# Patient Record
Sex: FEMALE | Race: WHITE | Employment: FULL TIME | URBAN - NONMETROPOLITAN AREA
[De-identification: names, ages, dates, MRNs, and addresses within clinical notes are randomized per-mention and may not be internally consistent; named-entity substitution may affect disease eponyms.]

---

## 2019-10-15 ENCOUNTER — OFFICE VISIT (OUTPATIENT)
Dept: FAMILY MEDICINE CLINIC | Age: 29
End: 2019-10-15
Payer: COMMERCIAL

## 2019-10-15 VITALS
WEIGHT: 158.4 LBS | BODY MASS INDEX: 28.07 KG/M2 | TEMPERATURE: 98.7 F | DIASTOLIC BLOOD PRESSURE: 74 MMHG | HEIGHT: 63 IN | SYSTOLIC BLOOD PRESSURE: 118 MMHG | HEART RATE: 78 BPM

## 2019-10-15 DIAGNOSIS — F41.9 ANXIETY: Primary | ICD-10-CM

## 2019-10-15 DIAGNOSIS — F32.A DEPRESSION, UNSPECIFIED DEPRESSION TYPE: ICD-10-CM

## 2019-10-15 PROCEDURE — 99213 OFFICE O/P EST LOW 20 MIN: CPT | Performed by: FAMILY MEDICINE

## 2019-10-15 RX ORDER — VILAZODONE HYDROCHLORIDE 40 MG/1
40 TABLET ORAL DAILY
Refills: 4 | COMMUNITY
Start: 2019-10-14 | End: 2019-10-15 | Stop reason: SDUPTHER

## 2019-10-15 RX ORDER — BUPROPION HYDROCHLORIDE 75 MG/1
75 TABLET ORAL 3 TIMES DAILY
Qty: 60 TABLET | Refills: 3 | Status: SHIPPED
Start: 2019-10-15 | End: 2020-10-06 | Stop reason: ALTCHOICE

## 2019-10-15 RX ORDER — VILAZODONE HYDROCHLORIDE 40 MG/1
40 TABLET ORAL DAILY
Qty: 30 TABLET | Refills: 5 | Status: SHIPPED
Start: 2019-10-15 | End: 2020-10-06 | Stop reason: ALTCHOICE

## 2019-10-15 ASSESSMENT — PATIENT HEALTH QUESTIONNAIRE - PHQ9
SUM OF ALL RESPONSES TO PHQ QUESTIONS 1-9: 2
2. FEELING DOWN, DEPRESSED OR HOPELESS: 1
1. LITTLE INTEREST OR PLEASURE IN DOING THINGS: 1
SUM OF ALL RESPONSES TO PHQ9 QUESTIONS 1 & 2: 2
SUM OF ALL RESPONSES TO PHQ QUESTIONS 1-9: 2

## 2019-10-15 ASSESSMENT — ENCOUNTER SYMPTOMS
PHOTOPHOBIA: 0
ABDOMINAL PAIN: 0
COUGH: 0
BACK PAIN: 0
CONSTIPATION: 0
SORE THROAT: 0
BLOOD IN STOOL: 0
SHORTNESS OF BREATH: 0
VOMITING: 0
DIARRHEA: 0
NAUSEA: 0

## 2020-06-16 ENCOUNTER — OFFICE VISIT (OUTPATIENT)
Dept: FAMILY MEDICINE CLINIC | Age: 30
End: 2020-06-16
Payer: COMMERCIAL

## 2020-06-16 VITALS
HEART RATE: 87 BPM | WEIGHT: 162 LBS | DIASTOLIC BLOOD PRESSURE: 80 MMHG | OXYGEN SATURATION: 97 % | TEMPERATURE: 99.1 F | SYSTOLIC BLOOD PRESSURE: 124 MMHG | RESPIRATION RATE: 16 BRPM | BODY MASS INDEX: 28.7 KG/M2

## 2020-06-16 PROCEDURE — 99212 OFFICE O/P EST SF 10 MIN: CPT | Performed by: FAMILY MEDICINE

## 2020-10-06 ENCOUNTER — HOSPITAL ENCOUNTER (OUTPATIENT)
Dept: CT IMAGING | Age: 30
Discharge: HOME | End: 2020-10-08
Payer: COMMERCIAL

## 2020-10-06 ENCOUNTER — OFFICE VISIT (OUTPATIENT)
Dept: FAMILY MEDICINE CLINIC | Age: 30
End: 2020-10-06
Payer: COMMERCIAL

## 2020-10-06 ENCOUNTER — TELEPHONE (OUTPATIENT)
Dept: FAMILY MEDICINE CLINIC | Age: 30
End: 2020-10-06

## 2020-10-06 VITALS
OXYGEN SATURATION: 99 % | TEMPERATURE: 98 F | HEART RATE: 104 BPM | WEIGHT: 159 LBS | HEIGHT: 63 IN | DIASTOLIC BLOOD PRESSURE: 66 MMHG | SYSTOLIC BLOOD PRESSURE: 118 MMHG | BODY MASS INDEX: 28.17 KG/M2

## 2020-10-06 PROCEDURE — 70450 CT HEAD/BRAIN W/O DYE: CPT

## 2020-10-06 PROCEDURE — 70486 CT MAXILLOFACIAL W/O DYE: CPT

## 2020-10-06 PROCEDURE — 99213 OFFICE O/P EST LOW 20 MIN: CPT | Performed by: FAMILY MEDICINE

## 2020-10-06 RX ORDER — ONDANSETRON 4 MG/1
4 TABLET, FILM COATED ORAL DAILY PRN
Qty: 30 TABLET | Refills: 0 | Status: SHIPPED
Start: 2020-10-06 | End: 2021-01-21

## 2020-10-06 ASSESSMENT — PATIENT HEALTH QUESTIONNAIRE - PHQ9
SUM OF ALL RESPONSES TO PHQ9 QUESTIONS 1 & 2: 0
SUM OF ALL RESPONSES TO PHQ QUESTIONS 1-9: 0
SUM OF ALL RESPONSES TO PHQ QUESTIONS 1-9: 0
1. LITTLE INTEREST OR PLEASURE IN DOING THINGS: 0
2. FEELING DOWN, DEPRESSED OR HOPELESS: 0

## 2020-10-06 ASSESSMENT — ENCOUNTER SYMPTOMS
DIARRHEA: 0
PHOTOPHOBIA: 1
NAUSEA: 0
BLOOD IN STOOL: 0
SHORTNESS OF BREATH: 0
CONSTIPATION: 0
COUGH: 0
BACK PAIN: 0
VOMITING: 0
SORE THROAT: 0
ABDOMINAL PAIN: 0

## 2020-10-06 NOTE — PATIENT INSTRUCTIONS
Patient Education        Concussion: Care Instructions  Your Care Instructions     A concussion is a kind of injury to the brain. It happens when the head receives a hard blow. The impact can jar or shake the brain against the skull. This interrupts the brain's normal activities. Although you may have cuts or bruises on your head or face, you may have no other visible signs of a brain injury. In most cases, damage to the brain from a concussion can't be seen in tests such as a CT or MRI scan. For a few weeks, you may have low energy, dizziness, trouble sleeping, a headache, ringing in your ears, or nausea. You may also feel anxious, grumpy, or depressed. You may have problems with memory and concentration. These symptoms are common after a concussion. They should slowly improve over time. Sometimes this takes weeks or even months. Someone who lives with you should know how to care for you. Please share this and all information with a caregiver who will be available to help if needed. Follow-up care is a key part of your treatment and safety. Be sure to make and go to all appointments, and call your doctor if you are having problems. It's also a good idea to know your test results and keep a list of the medicines you take. How can you care for yourself at home? Pain control  · Put ice or a cold pack on the part of your head that hurts for 10 to 20 minutes at a time. Put a thin cloth between the ice and your skin. · Be safe with medicines. Read and follow all instructions on the label. ? If the doctor gave you a prescription medicine for pain, take it as prescribed. ? If you are not taking a prescription pain medicine, ask your doctor if you can take an over-the-counter medicine. Recovery  · Follow your doctor's instructions. He or she will tell you if you need someone to watch you closely for the next 24 hours or longer. · Rest is the best way to recover from a concussion.  You need to rest your body and your brain:  ? Get plenty of sleep at night. And take rest breaks during the day. ? Avoid activities that take a lot of physical or mental work. This includes housework, exercise, schoolwork, video games, text messaging, and using the computer. ? You may need to change your school or work schedule while you recover. ? Return to your normal activities slowly. Do not try to do too much at once. · Do not drink alcohol or use illegal drugs. Alcohol and illegal drugs can slow your recovery. And they can increase your risk of a second brain injury. · Avoid activities that could lead to another concussion. Follow your doctor's instructions for a gradual return to activity and sports. · Ask your doctor when it's okay for you to drive a car, ride a bike, or operate machinery. How should you return to activity? Your return to activity can begin after 1 to 2 days of physical and mental rest. After resting, you can gradually increase your activity as long as it does not cause new symptoms or worsen your symptoms. Doctors and concussion specialists suggest steps to follow for returning to sports after a concussion. Use these steps as a guide. You should slowly progress through the following levels of activity:  1. Limited activity. You can take part in daily activities as long as the activity doesn't increase your symptoms or cause new symptoms. 2. Light aerobic activity. This can include walking, swimming, or other exercise at less than 70% of maximum heart rate. No resistance training is included in this step. 3. Sport-specific exercise. This includes running drills or skating drills (depending on the sport), but no head impact. 4. Noncontact training drills. This includes more complex training drills such as passing. The athlete may also begin light resistance training. 5. Full-contact practice. The athlete can participate in normal training. 6. Return to normal game play.  This is the final step and allows the the head or body, a fall, or another injury that jars or shakes the brain. It will take time for you to get better. You may worry about how you are feeling. This is normal. TBIs often have long-term effects. These include:  · Not thinking clearly, or having trouble remembering new information. · Having headaches, vision problems, or dizziness. · Feeling sad or nervous. · Getting angry easily. · Sleeping more or less than usual.  No one will be able to tell you for sure how long the symptoms will last. But there are things you can do to help yourself get better. You may need another person to watch you closely to make sure that your symptoms aren't getting worse. Follow your doctor's instructions about how long you need someone to stay with you. Follow-up care is a key part of your treatment and safety. Be sure to make and go to all appointments, and call your doctor if you are having problems. It's also a good idea to know your test results and keep a list of the medicines you take. How can you care for yourself at home? What you and your doctors can do  Different types of therapy and support may used to help you recover from a TBI. Follow the plan your doctor suggests. This may include:  · Physical and occupational therapy. These help you return to daily activities and live as independently as possible. · Speech and language therapy. You may need help understanding and producing language. Speech and language therapists also help you organize daily tasks and develop problem-solving methods. · Counseling. This can help you understand your thoughts and learn ways to cope with your feelings. Counseling can help you feel more in control. It can help get you back to your life's activities. · Social support and support groups. It's important that you get the chance to talk with people who are going through the same things you are.  Your family or friends may be able to help you get treatment and deal with your symptoms. · Medicines. These may help relieve symptoms like sleep problems, chronic pain, and headaches. Medicine can also help if you have anxiety, depression, or memory problems. Talk with your doctor about what medicines might be best for you. Also ask which medicines you should not take. What you can do  Here are some ways you can help yourself:  · Get plenty of sleep, and take it easy during the day. Rest is the best way to recover. · Don't drink alcohol or use illegal drugs. · Don't drive a car, ride a bike, or operate machinery until your doctor says it's okay. · Avoid activities that are physically or mentally demanding. These include housework, exercise, schoolwork, video games, text messaging, or using the computer. You may need to change your school or work schedule for a while. · If you feel grumpy or irritable, get away from whatever is bothering you. When should you call for help? Watch closely for changes in your health, and be sure to contact your doctor if:  · Your symptoms get worse. These include headaches, trouble concentrating, or changes in your mood. · You have been feeling sad, depressed, or hopeless, or have lost interest in things you usually enjoy. · You do not get better as expected. Where can you learn more? Go to https://[employer].Buyt.In. org and sign in to your [employer] account. Enter P943 in the [facility] box to learn more about \"Traumatic Brain Injury, Long-Term Healing: Care Instructions. \"     If you do not have an account, please click on the \"Sign Up Now\" link. Current as of: November 20, 2019               Content Version: 12.5  © 1068-4301 Healthwise, Incorporated. Care instructions adapted under license by [facility] ([facility]). If you have questions about a medical condition or this instruction, always ask your healthcare professional. [address] any warranty or liability for your use of this information.

## 2020-10-06 NOTE — TELEPHONE ENCOUNTER
Isi calling to ask if you have restrictions for her at this time. Should she be driving, going to work and to school?

## 2020-10-06 NOTE — TELEPHONE ENCOUNTER
At this time the restrictions are going to be based on her symptoms. Current evidence shows that there is no benefit for cognitive rest.  However if there is a significant increase in symptoms with other work or school activity these activities should be adjusted. The same was driving if she is having issues with vision, dizziness, or headache which should limit or refrain from driving. We can put a note in to work or school for whatever she would like. As far as references the [facility] and [facility] has an extensive list of treatments and information about postconcussive symptoms. From my and if she is not any better significantly in the next week we will need to see her for possible neurology referral.  Please let me know what she would like to do.

## 2020-10-06 NOTE — PROGRESS NOTES
quitting: 10.4    Smokeless tobacco: Never Used   Substance Use Topics    Alcohol use: No        Vitals:    10/06/20 0923   BP: 118/66   Pulse: 104   Temp: 98 °F (36.7 °C)   TempSrc: Temporal   SpO2: 99%   Weight: 159 lb (72.1 kg)   Height: 5' 3\" (1.6 m)     Estimated body mass index is 28.17 kg/m² as calculated from the following:    Height as of this encounter: 5' 3\" (1.6 m). Weight as of this encounter: 159 lb (72.1 kg). Physical Exam  Vitals signs reviewed. HENT:      Head: Normocephalic and atraumatic. Nose: Signs of injury and nasal tenderness present. Eyes:      General: Lids are normal. No scleral icterus. Extraocular Movements:      Right eye: Nystagmus present. Left eye: Nystagmus present. Conjunctiva/sclera: Conjunctivae normal.      Pupils: Pupils are equal, round, and reactive to light. Neck:      Musculoskeletal: Neck supple. Thyroid: No thyromegaly. Cardiovascular:      Rate and Rhythm: Normal rate and regular rhythm. Heart sounds: Normal heart sounds. No murmur. Pulmonary:      Effort: Pulmonary effort is normal.      Breath sounds: Normal breath sounds. No rales. Abdominal:      General: Bowel sounds are normal. There is no distension. Palpations: Abdomen is soft. Tenderness: There is no abdominal tenderness. Musculoskeletal: Normal range of motion. Lymphadenopathy:      Cervical: No cervical adenopathy. Skin:     General: Skin is warm and dry. Findings: No erythema or rash. Neurological:      Mental Status: She is alert and oriented to person, place, and time. Cranial Nerves: No cranial nerve deficit. Sensory: Sensory deficit present. Comments: Horizontal nystagmus bilaterally as noted previous. Symptoms elicited with horizontal and vertical rapid eye movement testing. Negative pronator drift. Strength is intact to bilateral upper and lower extremities. Sensation deficits to left upper and lower extremity. Positive Romberg. Psychiatric:         Judgment: Judgment normal.         Assessment/Plan:   Diagnosis Orders   1. Traumatic brain injury, without loss of consciousness, initial encounter ([facility] [address]  CT HEAD WO CONTRAST    CT FACIAL BONES WO CONTRAST    ondansetron (ZOFRAN) 4 MG tablet   2. Concussion without loss of consciousness, initial encounter  CT HEAD WO CONTRAST    CT FACIAL BONES WO CONTRAST    ondansetron (ZOFRAN) 4 MG tablet   3. Closed fracture of nasal bone, initial encounter  CT HEAD WO CONTRAST    CT FACIAL BONES WO CONTRAST    ondansetron (ZOFRAN) 4 MG tablet       At this time we will order a stat CT of the head to rule out any intracranial issues. Treat symptomatically if patient cannot postconcussive syndrome. Exercise discussed with patient. Pain is. She is to go directly to the emergency department. Patient voiced understanding. Junaid May D.O.   12:22 PM  10/6/2020       This document may have been prepared at least partially through the use of voice recognition software. Although effort is taken to assure the accuracy of this document, it is possible that grammatical, syntax,  or spelling errors may occur.

## 2021-01-21 ENCOUNTER — OFFICE VISIT (OUTPATIENT)
Dept: PRIMARY CARE CLINIC | Age: 31
End: 2021-01-21
Payer: COMMERCIAL

## 2021-01-21 VITALS
RESPIRATION RATE: 16 BRPM | TEMPERATURE: 97.3 F | WEIGHT: 164 LBS | HEART RATE: 101 BPM | SYSTOLIC BLOOD PRESSURE: 132 MMHG | DIASTOLIC BLOOD PRESSURE: 72 MMHG | BODY MASS INDEX: 29.06 KG/M2 | OXYGEN SATURATION: 97 % | HEIGHT: 63 IN

## 2021-01-21 DIAGNOSIS — R10.9 FLANK PAIN: ICD-10-CM

## 2021-01-21 DIAGNOSIS — R10.9 FLANK PAIN: Primary | ICD-10-CM

## 2021-01-21 PROBLEM — M54.9 RIGHT-SIDED BACK PAIN: Status: ACTIVE | Noted: 2021-01-18

## 2021-01-21 LAB
BILIRUBIN, POC: NORMAL
BLOOD URINE, POC: NORMAL
CLARITY, POC: CLEAR
COLOR, POC: YELLOW
GLUCOSE URINE, POC: NORMAL
KETONES, POC: NORMAL
LEUKOCYTE EST, POC: NORMAL
NITRITE, POC: NORMAL
PH, POC: 6.5
PROTEIN, POC: NORMAL
SPECIFIC GRAVITY, POC: >=1.03
UROBILINOGEN, POC: 0.2

## 2021-01-21 PROCEDURE — 81002 URINALYSIS NONAUTO W/O SCOPE: CPT | Performed by: FAMILY MEDICINE

## 2021-01-21 PROCEDURE — 99213 OFFICE O/P EST LOW 20 MIN: CPT | Performed by: FAMILY MEDICINE

## 2021-01-21 ASSESSMENT — PATIENT HEALTH QUESTIONNAIRE - PHQ9
SUM OF ALL RESPONSES TO PHQ QUESTIONS 1-9: 0
SUM OF ALL RESPONSES TO PHQ9 QUESTIONS 1 & 2: 0
SUM OF ALL RESPONSES TO PHQ QUESTIONS 1-9: 0
1. LITTLE INTEREST OR PLEASURE IN DOING THINGS: 0

## 2021-01-21 ASSESSMENT — ENCOUNTER SYMPTOMS
GASTROINTESTINAL NEGATIVE: 1
BLOOD IN STOOL: 0
COUGH: 0
BACK PAIN: 1
NAUSEA: 0
DIARRHEA: 0
SHORTNESS OF BREATH: 0
VOMITING: 0
SORE THROAT: 0
RESPIRATORY NEGATIVE: 1
ABDOMINAL PAIN: 0
CONSTIPATION: 0
PHOTOPHOBIA: 0

## 2021-01-21 NOTE — PROGRESS NOTES
2021     Isi Wilder (:  1990) is a 31 y.o. female, here for evaluation of the following medical concerns:    HPI  Patient presents today for evaluation of flank pain. Patient states that the symptoms have been present for the last several days. Patient states that she is mildly concerned when the pain starts in the right flank/back region and radiates to the anterior chest.  Patient denies any dyspnea, nausea, vomiting, constipation, or diarrhea. Denies any excessive fatigue or myalgia. Denies any loss of taste or smell. Denies any known sick contact. Patient currently has IUD and has not had a menstrual period in quite some time. Denies any trauma or injury to the affected region. Denies any overuse or heavy lifting prior to symptoms beginning. Symptoms do not radiate down the legs. Patient states that there is nothing that makes the pain better or worse. Is not associated with eating. Review of Systems   Constitutional: Negative. Negative for chills and fever. HENT: Negative. Negative for congestion, hearing loss, nosebleeds and sore throat. Eyes: Negative for photophobia. Respiratory: Negative. Negative for cough and shortness of breath. Cardiovascular: Negative. Negative for chest pain, palpitations and leg swelling. Gastrointestinal: Negative. Negative for abdominal pain, blood in stool, constipation, diarrhea, nausea and vomiting. Endocrine: Negative for polydipsia. Genitourinary: Positive for flank pain. Negative for dysuria, frequency, hematuria and urgency. Musculoskeletal: Positive for back pain. Negative for arthralgias, gait problem, joint swelling and myalgias. Skin: Negative. Neurological: Negative for dizziness, tremors, weakness and headaches. Hematological: Does not bruise/bleed easily. Psychiatric/Behavioral: Negative for hallucinations and suicidal ideas. All other systems reviewed and are negative.       Prior to Visit Medications    Not on File        Social History     Tobacco Use    Smoking status: Former Smoker     Quit date: 4/14/2010     Years since quitting: 10.7    Smokeless tobacco: Never Used   Substance Use Topics    Alcohol use: No        Vitals:    01/21/21 1308   BP: 132/72   Pulse: 101   Resp: 16   Temp: 97.3 °F (36.3 °C)   SpO2: 97%   Weight: 164 lb (74.4 kg)   Height: 5' 3\" (1.6 m)     Estimated body mass index is 29.05 kg/m² as calculated from the following:    Height as of this encounter: 5' 3\" (1.6 m). Weight as of this encounter: 164 lb (74.4 kg). Physical Exam  Vitals signs reviewed. HENT:      Head: Normocephalic and atraumatic. Eyes:      General: No scleral icterus. Conjunctiva/sclera: Conjunctivae normal.      Pupils: Pupils are equal, round, and reactive to light. Neck:      Musculoskeletal: Neck supple. Thyroid: No thyromegaly. Cardiovascular:      Rate and Rhythm: Normal rate and regular rhythm. Heart sounds: Normal heart sounds. No murmur. Pulmonary:      Effort: Pulmonary effort is normal.      Breath sounds: Normal breath sounds. No rales. Abdominal:      General: Abdomen is flat. Bowel sounds are decreased. There is no distension. Palpations: Abdomen is soft. Tenderness: There is no abdominal tenderness. There is right CVA tenderness. There is no guarding or rebound. Negative signs include Chauhan's sign, Rovsing's sign, McBurney's sign and obturator sign. Musculoskeletal:         General: Tenderness present. Lumbar back: She exhibits tenderness, pain and spasm. Lymphadenopathy:      Cervical: No cervical adenopathy. Skin:     General: Skin is warm and dry. Findings: No erythema or rash. Neurological:      Mental Status: She is alert and oriented to person, place, and time. Cranial Nerves: No cranial nerve deficit. Psychiatric:         Judgment: Judgment normal.         Assessment/Plan:   Diagnosis Orders   1.  Flank pain  POCT Urinalysis no Micro    Culture, Urine    XR ACUTE ABD SERIES CHEST 1 VW     At this time patient advised to treat symptomatically. Radiologist saw no acute issue at this time. Patient given concerns regarding possible radiolucent kidney stone versus small kidney stone causing some of her issues. No signs of hydronephrosis or cholecystitis seen on x-ray. Red flags discussed with patient. Any of these occur she is to go directly to the emergency department. Patient will increase fluid intake and use ibuprofen as needed 3 times daily. Will call Monday if no improvement in symptoms or sooner if symptoms worsen. Sonu Larson D.O.   2:40 PM  1/21/2021       This document may have been prepared at least partially through the use of voice recognition software. Although effort is taken to assure the accuracy of this document, it is possible that grammatical, syntax,  or spelling errors may occur.

## 2021-01-22 ENCOUNTER — PATIENT MESSAGE (OUTPATIENT)
Dept: PRIMARY CARE CLINIC | Age: 31
End: 2021-01-22

## 2021-01-22 DIAGNOSIS — R10.9 FLANK PAIN: Primary | ICD-10-CM

## 2021-01-22 RX ORDER — TIZANIDINE 4 MG/1
2-4 TABLET ORAL EVERY 8 HOURS PRN
Qty: 90 TABLET | Refills: 3 | Status: SHIPPED
Start: 2021-01-22 | End: 2021-05-12

## 2021-01-22 NOTE — TELEPHONE ENCOUNTER
From: Manju Ibarra  To: Mary Burr DO  Sent: 1/22/2021 11:23 AM EST  Subject: Prescription Question    Hello,  My pain has worsened today. Can that muscle relaxer prescription be called in? After consideration , I figured it is an opportunity to see if it helps or not and help narrow the cause of my pain.    [facility] in [place], [place]    Thank you  Melvina Robin

## 2021-01-23 LAB — URINE CULTURE, ROUTINE: NORMAL

## 2021-05-12 ENCOUNTER — HOSPITAL ENCOUNTER (EMERGENCY)
Age: 31
Discharge: HOME | End: 2021-05-12
Attending: EMERGENCY MEDICINE
Payer: COMMERCIAL

## 2021-05-12 ENCOUNTER — TELEPHONE (OUTPATIENT)
Dept: FAMILY MEDICINE CLINIC | Age: 31
End: 2021-05-12

## 2021-05-12 VITALS
TEMPERATURE: 98.2 F | BODY MASS INDEX: 27.31 KG/M2 | SYSTOLIC BLOOD PRESSURE: 113 MMHG | WEIGHT: 160 LBS | RESPIRATION RATE: 20 BRPM | DIASTOLIC BLOOD PRESSURE: 79 MMHG | HEART RATE: 96 BPM | OXYGEN SATURATION: 96 % | HEIGHT: 64 IN

## 2021-05-12 DIAGNOSIS — M54.31 SCIATICA OF RIGHT SIDE: Primary | ICD-10-CM

## 2021-05-12 PROCEDURE — 96372 THER/PROPH/DIAG INJ SC/IM: CPT

## 2021-05-12 PROCEDURE — 99284 EMERGENCY DEPT VISIT MOD MDM: CPT

## 2021-05-12 PROCEDURE — 6360000002 HC RX W HCPCS: Performed by: EMERGENCY MEDICINE

## 2021-05-12 RX ORDER — CYCLOBENZAPRINE HCL 5 MG
5 TABLET ORAL 3 TIMES DAILY PRN
Qty: 9 TABLET | Refills: 0 | Status: SHIPPED | OUTPATIENT
Start: 2021-05-12 | End: 2021-05-15

## 2021-05-12 RX ORDER — OXYCODONE HYDROCHLORIDE AND ACETAMINOPHEN 5; 325 MG/1; MG/1
1 TABLET ORAL EVERY 6 HOURS PRN
Qty: 12 TABLET | Refills: 0 | Status: SHIPPED | OUTPATIENT
Start: 2021-05-12 | End: 2021-05-15

## 2021-05-12 RX ORDER — KETOROLAC TROMETHAMINE 30 MG/ML
30 INJECTION, SOLUTION INTRAMUSCULAR; INTRAVENOUS ONCE
Status: COMPLETED | OUTPATIENT
Start: 2021-05-12 | End: 2021-05-12

## 2021-05-12 RX ADMIN — KETOROLAC TROMETHAMINE 30 MG: 30 INJECTION, SOLUTION INTRAMUSCULAR; INTRAVENOUS at 05:35

## 2021-05-12 ASSESSMENT — PAIN SCALES - GENERAL
PAINLEVEL_OUTOF10: 10
PAINLEVEL_OUTOF10: 10

## 2021-05-12 ASSESSMENT — PAIN DESCRIPTION - ORIENTATION: ORIENTATION: RIGHT

## 2021-05-12 ASSESSMENT — PAIN DESCRIPTION - LOCATION: LOCATION: LEG

## 2021-05-12 ASSESSMENT — PAIN DESCRIPTION - DESCRIPTORS: DESCRIPTORS: STABBING;SHARP

## 2021-05-12 ASSESSMENT — PAIN DESCRIPTION - ONSET: ONSET: ON-GOING

## 2021-05-12 NOTE — ED PROVIDER NOTES
HPI:  5/12/21,   Time: 5:23 AM EDT       Stanislav Cruz is a 36 y.o. female presenting to the ED for right leg pain, beginning 1 day ago. The complaint has been persistent, moderate in severity, and worsened by changing position. Patient is a pleasant 26-year-old female who states she began having this pain yesterday that radiates from the right low back into the right hip and laterally down the right side of the thigh towards the knee. She has no focal hip or knee pain no pain to touch in his areas no recent fall or injury. She states does not recall lifting or straining anything but this feels similar to when she had sciatica during her pregnancy. She has not pregnant this time. Patient has no pain to the calf or swelling to the leg. She has no bruising or redness. No fevers or chills. No numbness or weakness to the leg but she states certain positions twisting and moving seem to elicit a sharp burning pain that goes into the right hip and down the right side of her lower lateral leg. No bowel or bladder incontinence no numbness or weakness. No urinary complaints. No fevers or chills no previous history of trauma or known disc disease. No IV drug use. No dysuria or urinary urgency or frequency. Patient is able to ambulate without difficulty. Review of Systems:   Pertinent positives and negatives are stated within HPI, all other systems reviewed and are negative.          --------------------------------------------- PAST HISTORY ---------------------------------------------  Past Medical History:  has a past medical history of Anemia and Depression. Past Surgical History:  has a past surgical history that includes Tonsillectomy. Social History:  reports that she quit smoking about 11 years ago. Her smoking use included cigarettes. She has never used smokeless tobacco. She reports current alcohol use. She reports that she does not use drugs.     Family History: family history is not on reviewed all laboratory and imaging results for this patient. Results are listed below. LABS:  No results found for this visit on 05/12/21. RADIOLOGY:  Interpreted by Radiologist.  No orders to display           ------------------------- NURSING NOTES AND VITALS REVIEWED ---------------------------   The nursing notes within the ED encounter and vital signs as below have been reviewed by myself. /79   Pulse 96   Temp 98.2 °F (36.8 °C) (Temporal)   Resp 20   Ht 5' 4\" (1.626 m)   Wt 160 lb (72.6 kg)   SpO2 96%   BMI 27.46 kg/m²   Oxygen Saturation Interpretation: Normal    The patients available past medical records and past encounters were reviewed. ------------------------------ ED COURSE/MEDICAL DECISION MAKING----------------------  Medications   ketorolac (TORADOL) injection 30 mg (30 mg Intramuscular Given 5/12/21 0535)         ED COURSE:       Medical Decision Making:    Patient is a pleasant 79-year-old female who reports right-sided leg pain with radicular symptoms into the right hip and down the right lateral thigh since yesterday. She states it feels felt similar to when she is had sciatica in the past.  She has no numbness or weakness no bowel bladder incontinence fever or chills. She is able to ambulate but states certain positions elicit the sharp burning pain down the leg. Differential diagnosis sciatica nerve pain radiculopathy musculoskeletal pain  Hip pain muscle strain    This patient has remained hemodynamically stable during their ED course. Patient was given a shot given a shot of IM Toradol here as she did drive here we are unable to give narcotics or muscle relaxers. She will be given a short course of Flexeril as well as Percocet. Anthony Caruso for the PDMP I see no red flags or short course of these medications she was advised to follow-up outpatient with her PCP and was given orthopedic spine Dr. Noa Pelayo to follow-up with as well.   Patient is comfortable the outpatient plan. Re-Evaluations:             Re-evaluation. Patients symptoms are improving. Patient had some improvement with Toradol but drove here so she is aware that she cannot receive any stronger pain medication or muscle relaxers prior to driving home. She is comfortable to plan for the prescriptions and she will fill them on her way home. Re-examination  5/12/21   5:23 AM EDT          Vital Signs:   Vitals:    05/12/21 0359 05/12/21 0537   BP: (!) 154/81 113/79   Pulse: 113 96   Resp: 20    Temp: 98.2 °F (36.8 °C)    TempSrc: Temporal    SpO2: 96%    Weight: 160 lb (72.6 kg)    Height: 5' 4\" (1.626 m)                  Counseling: The emergency provider has spoken with the patient and discussed todays results, in addition to providing specific details for the plan of care and counseling regarding the diagnosis and prognosis. Questions are answered at this time and they are agreeable with the plan.       --------------------------------- IMPRESSION AND DISPOSITION ---------------------------------    IMPRESSION  1. Sciatica of right side Stable       DISPOSITION  Disposition: Discharge to home  Patient condition is stable    NOTE: This report was transcribed using voice recognition software.  Every effort was made to ensure accuracy; however, inadvertent computerized transcription errors may be present        Partha Vera MD  05/12/21 2615

## 2021-05-12 NOTE — TELEPHONE ENCOUNTER
Patient was seen in [facility] on 5/11/2021. Patient stated that she was not sure if the medications that she was given were correct for her diagnosis. Patient stated that the note the doctor wrote was not why the patient was in the emergency room. Nurse spoke with Dr. Bettie Lee and he relayed to have patient take flexeril, however not the percocet.  Nurse made follow up appointment for tomorrow with Dr. Bettie Lee in [facility] for 9:00am, patient informed

## 2021-05-13 ENCOUNTER — OFFICE VISIT (OUTPATIENT)
Dept: PRIMARY CARE CLINIC | Age: 31
End: 2021-05-13
Payer: COMMERCIAL

## 2021-05-13 VITALS
HEART RATE: 84 BPM | OXYGEN SATURATION: 98 % | BODY MASS INDEX: 27.31 KG/M2 | RESPIRATION RATE: 16 BRPM | HEIGHT: 64 IN | TEMPERATURE: 97.6 F | WEIGHT: 160 LBS | DIASTOLIC BLOOD PRESSURE: 70 MMHG | SYSTOLIC BLOOD PRESSURE: 122 MMHG

## 2021-05-13 DIAGNOSIS — M25.551 RIGHT HIP PAIN: Primary | ICD-10-CM

## 2021-05-13 DIAGNOSIS — M54.41 ACUTE RIGHT-SIDED LOW BACK PAIN WITH RIGHT-SIDED SCIATICA: ICD-10-CM

## 2021-05-13 PROCEDURE — 99213 OFFICE O/P EST LOW 20 MIN: CPT | Performed by: FAMILY MEDICINE

## 2021-05-13 PROCEDURE — 96372 THER/PROPH/DIAG INJ SC/IM: CPT | Performed by: FAMILY MEDICINE

## 2021-05-13 RX ORDER — METHYLPREDNISOLONE ACETATE 80 MG/ML
80 INJECTION, SUSPENSION INTRA-ARTICULAR; INTRALESIONAL; INTRAMUSCULAR; SOFT TISSUE ONCE
Status: COMPLETED | OUTPATIENT
Start: 2021-05-13 | End: 2021-05-13

## 2021-05-13 RX ORDER — METHYLPREDNISOLONE 4 MG/1
TABLET ORAL
Qty: 1 KIT | Refills: 0 | Status: SHIPPED
Start: 2021-05-13 | End: 2021-08-30

## 2021-05-13 RX ORDER — TIZANIDINE 4 MG/1
2-4 TABLET ORAL EVERY 8 HOURS PRN
Qty: 90 TABLET | Refills: 3 | Status: SHIPPED
Start: 2021-05-13 | End: 2021-08-30

## 2021-05-13 RX ADMIN — METHYLPREDNISOLONE ACETATE 80 MG: 80 INJECTION, SUSPENSION INTRA-ARTICULAR; INTRALESIONAL; INTRAMUSCULAR; SOFT TISSUE at 10:01

## 2021-05-13 ASSESSMENT — ENCOUNTER SYMPTOMS
RESPIRATORY NEGATIVE: 1
GASTROINTESTINAL NEGATIVE: 1

## 2021-05-13 NOTE — PROGRESS NOTES
in her right hip/upper leg region was excruciating. She was able to put weight on the affected region but only in certain positions. She states that the pain was too much and she presented to the emergency department. She states that the emergency room physician only did a cursory exam and no imaging. She was prescribed pain medication and Flexeril and told to follow up with an orthopedic surgeon. She states that she did take the pain medication because of the unbearable pain to help her sleep. She also has taken the Flexeril which she states takes the edge off slightly. Patient denies any bowel or bladder incontinence. Patient denies any recent trauma. Patient has had issues with sciatica in the past.      Review of Systems   Constitutional: Negative. HENT: Negative. Respiratory: Negative. Cardiovascular: Negative. Gastrointestinal: Negative. Musculoskeletal: Positive for arthralgias, gait problem, joint swelling and myalgias. Neurological: Positive for weakness. All other systems reviewed and are negative. Current Outpatient Medications:     methylPREDNISolone (MEDROL DOSEPACK) 4 MG tablet, Take by mouth., Disp: 1 kit, Rfl: 0    Levonorgestrel (MIRENA, 52 MG, IU), by Intrauterine route, Disp: , Rfl:     oxyCODONE-acetaminophen (PERCOCET) 5-325 MG per tablet, Take 1 tablet by mouth every 6 hours as needed for Pain for up to 3 days. Intended supply: 3 days.  Take lowest dose possible to manage pain, Disp: 12 tablet, Rfl: 0    cyclobenzaprine (FLEXERIL) 5 MG tablet, Take 1 tablet by mouth 3 times daily as needed for Muscle spasms, Disp: 9 tablet, Rfl: 0   Patient Active Problem List   Diagnosis    Flank pain    Acute right-sided low back pain with right-sided sciatica    Right hip pain     Past Medical History:   Diagnosis Date    Anemia     Depression      Past Surgical History:   Procedure Laterality Date    TONSILLECTOMY       Social History     Socioeconomic History    Marital status:      Spouse name: Not on file    Number of children: Not on file    Years of education: Not on file    Highest education level: Not on file   Occupational History    Not on file   Social Needs    Financial resource strain: Not on file    Food insecurity     Worry: Not on file     Inability: Not on file    Transportation needs     Medical: Not on file     Non-medical: Not on file   Tobacco Use    Smoking status: Former Smoker     Types: Cigarettes     Quit date: 2010     Years since quittin.0    Smokeless tobacco: Never Used   Substance and Sexual Activity    Alcohol use: Yes     Comment: occasional wine    Drug use: No    Sexual activity: Yes     Partners: Male   Lifestyle    Physical activity     Days per week: Not on file     Minutes per session: Not on file    Stress: Not on file   Relationships    Social connections     Talks on phone: Not on file     Gets together: Not on file     Attends Zoroastrianism service: Not on file     Active member of club or organization: Not on file     Attends meetings of clubs or organizations: Not on file     Relationship status: Not on file    Intimate partner violence     Fear of current or ex partner: Not on file     Emotionally abused: Not on file     Physically abused: Not on file     Forced sexual activity: Not on file   Other Topics Concern    Not on file   Social History Narrative    Not on file     History reviewed. No pertinent family history. There are no preventive care reminders to display for this patient. There are no preventive care reminders to display for this patient. There are no preventive care reminders to display for this patient. There are no preventive care reminders to display for this patient.    Health Maintenance   Topic Date Due    Hepatitis C screen  Never done    Varicella vaccine (1 of 2 - 2-dose childhood series) Never done    COVID-19 Vaccine (1) Never done    HIV screen  Never done   [facility] Cervical cancer screen  Never done    Flu vaccine (Season Ended) 09/01/2021    DTaP/Tdap/Td vaccine (8 - Td) 05/29/2026    Hepatitis B vaccine  Completed    Hib vaccine  Completed    Hepatitis A vaccine  Aged Out    Meningococcal (ACWY) vaccine  Aged Out    Pneumococcal 0-64 years Vaccine  Aged Out      There are no preventive care reminders to display for this patient. There are no preventive care reminders to display for this patient. /70   Pulse 84   Temp 97.6 °F (36.4 °C)   Resp 16   Ht 5' 4\" (1.626 m)   Wt 160 lb (72.6 kg) Comment: 5/12/21  SpO2 98%   BMI 27.46 kg/m²       Physical Exam  Vitals signs reviewed. HENT:      Head: Normocephalic and atraumatic. Eyes:      General: No scleral icterus. Conjunctiva/sclera: Conjunctivae normal.      Pupils: Pupils are equal, round, and reactive to light. Neck:      Musculoskeletal: Neck supple. Thyroid: No thyromegaly. Cardiovascular:      Rate and Rhythm: Normal rate and regular rhythm. Heart sounds: Normal heart sounds. No murmur. Pulmonary:      Effort: Pulmonary effort is normal.      Breath sounds: Normal breath sounds. No rales. Abdominal:      General: Bowel sounds are normal. There is no distension. Palpations: Abdomen is soft. Tenderness: There is no abdominal tenderness. Musculoskeletal:        Legs:    Lymphadenopathy:      Cervical: No cervical adenopathy. Skin:     General: Skin is warm and dry. Findings: No erythema or rash. Neurological:      Mental Status: She is alert and oriented to person, place, and time. Cranial Nerves: No cranial nerve deficit. Psychiatric:         Judgment: Judgment normal.                 An electronic signature was used to authenticate this note.     --Damon Davis,

## 2021-08-26 ENCOUNTER — TELEPHONE (OUTPATIENT)
Dept: FAMILY MEDICINE CLINIC | Age: 31
End: 2021-08-26

## 2021-08-26 NOTE — TELEPHONE ENCOUNTER
Kenisha Horton would like to switch to dr Preet Kaba for new pcp if able? She prefers a female dr with more [place] availability.

## 2021-08-30 ENCOUNTER — OFFICE VISIT (OUTPATIENT)
Dept: FAMILY MEDICINE CLINIC | Age: 31
End: 2021-08-30
Payer: COMMERCIAL

## 2021-08-30 VITALS
WEIGHT: 166 LBS | OXYGEN SATURATION: 95 % | SYSTOLIC BLOOD PRESSURE: 112 MMHG | TEMPERATURE: 98.3 F | BODY MASS INDEX: 28.49 KG/M2 | DIASTOLIC BLOOD PRESSURE: 78 MMHG | HEART RATE: 85 BPM

## 2021-08-30 DIAGNOSIS — B96.89 ACUTE BACTERIAL SINUSITIS: Primary | ICD-10-CM

## 2021-08-30 DIAGNOSIS — Z20.822 EXPOSURE TO COVID-19 VIRUS: ICD-10-CM

## 2021-08-30 DIAGNOSIS — J01.90 ACUTE BACTERIAL SINUSITIS: Primary | ICD-10-CM

## 2021-08-30 PROCEDURE — 99213 OFFICE O/P EST LOW 20 MIN: CPT | Performed by: FAMILY MEDICINE

## 2021-08-30 PROCEDURE — 96372 THER/PROPH/DIAG INJ SC/IM: CPT | Performed by: FAMILY MEDICINE

## 2021-08-30 RX ORDER — METHYLPREDNISOLONE ACETATE 40 MG/ML
40 INJECTION, SUSPENSION INTRA-ARTICULAR; INTRALESIONAL; INTRAMUSCULAR; SOFT TISSUE ONCE
Status: COMPLETED | OUTPATIENT
Start: 2021-08-30 | End: 2021-08-30

## 2021-08-30 RX ORDER — GUAIFENESIN 600 MG/1
600 TABLET, EXTENDED RELEASE ORAL 2 TIMES DAILY
Qty: 30 TABLET | Refills: 0 | Status: SHIPPED | OUTPATIENT
Start: 2021-08-30 | End: 2021-09-14

## 2021-08-30 RX ORDER — AZITHROMYCIN 250 MG/1
250 TABLET, FILM COATED ORAL SEE ADMIN INSTRUCTIONS
Qty: 6 TABLET | Refills: 0 | Status: SHIPPED | OUTPATIENT
Start: 2021-08-30 | End: 2021-09-04

## 2021-08-30 RX ADMIN — METHYLPREDNISOLONE ACETATE 40 MG: 40 INJECTION, SUSPENSION INTRA-ARTICULAR; INTRALESIONAL; INTRAMUSCULAR; SOFT TISSUE at 11:50

## 2021-08-30 NOTE — LETTER
NOTIFICATION RETURN TO WORK / SCHOOL    8/30/2021    Ms. [facility]  [address]  [place] [phone]      To Whom It May Concern:    [facility] was tested for COVID-19 on 8/30, and the result is pending. She may return to work upon receiving negative test results or 10 days after onset of symptoms if test is positive. I recommend:return without restrictions    If there are questions or concerns, please have the patient contact our office.         Sincerely,      Bismark Weathers M.D.

## 2021-08-30 NOTE — PROGRESS NOTES
on days 2 - 5  -     guaiFENesin (MUCINEX) 600 MG extended release tablet; Take 1 tablet by mouth 2 times daily for 15 days    Exposure to COVID-19 virus  -     methylPREDNISolone acetate (DEPO-MEDROL) injection 40 mg  -     azithromycin (ZITHROMAX) 250 MG tablet; Take 1 tablet by mouth See Admin Instructions for 5 days 500mg on day 1 followed by 250mg on days 2 - 5  -     guaiFENesin (MUCINEX) 600 MG extended release tablet; Take 1 tablet by mouth 2 times daily for 15 days  -     COVID-19 Ambulatory; Future    Will test for Covid. Seems more likely just sinusitis      No follow-ups on file.     Electronically signed by Tariq Hopper MD on 8/30/21 at 11:08 AM EDT

## 2021-09-03 ENCOUNTER — OFFICE VISIT (OUTPATIENT)
Dept: FAMILY MEDICINE CLINIC | Age: 31
End: 2021-09-03
Payer: COMMERCIAL

## 2021-09-03 VITALS
BODY MASS INDEX: 27.83 KG/M2 | SYSTOLIC BLOOD PRESSURE: 110 MMHG | TEMPERATURE: 98.4 F | WEIGHT: 163 LBS | HEIGHT: 64 IN | DIASTOLIC BLOOD PRESSURE: 70 MMHG | HEART RATE: 84 BPM | OXYGEN SATURATION: 98 %

## 2021-09-03 DIAGNOSIS — R51.9 INTRACTABLE HEADACHE, UNSPECIFIED CHRONICITY PATTERN, UNSPECIFIED HEADACHE TYPE: Primary | ICD-10-CM

## 2021-09-03 PROCEDURE — 99213 OFFICE O/P EST LOW 20 MIN: CPT | Performed by: INTERNAL MEDICINE

## 2021-09-03 PROCEDURE — 96372 THER/PROPH/DIAG INJ SC/IM: CPT | Performed by: INTERNAL MEDICINE

## 2021-09-03 RX ORDER — KETOROLAC TROMETHAMINE 30 MG/ML
30 INJECTION, SOLUTION INTRAMUSCULAR; INTRAVENOUS ONCE
Status: COMPLETED | OUTPATIENT
Start: 2021-09-03 | End: 2021-09-03

## 2021-09-03 RX ORDER — PREDNISONE 10 MG/1
TABLET ORAL
Qty: 12 TABLET | Refills: 0 | Status: SHIPPED | OUTPATIENT
Start: 2021-09-03 | End: 2021-09-09

## 2021-09-03 RX ADMIN — KETOROLAC TROMETHAMINE 30 MG: 30 INJECTION, SOLUTION INTRAMUSCULAR; INTRAVENOUS at 09:28

## 2021-09-03 ASSESSMENT — ENCOUNTER SYMPTOMS
ABDOMINAL PAIN: 0
DIARRHEA: 0
VOMITING: 0
NAUSEA: 0
SHORTNESS OF BREATH: 0
COUGH: 0

## 2021-09-03 NOTE — PROGRESS NOTES
[address] [place]     9/3/21  Georgina Gray : 1990 Sex: female  Age: 36 y.o. Chief Complaint   Patient presents with    Headache       HPI    Patient presents today to express care complaining of intractable headache over the last week. States she was seen 4 days ago in [facility] with flulike symptoms. States she had a headache at that time fever nausea some sinus congestion and drainage. Was given a shot of methylprednisolone and a Z-Yonis. States all her symptoms have resolved now except a headache which is persistent. States she is really not a headachy type person. She was tested for Covid and negative. She is vaccinated also. Review of Systems   Constitutional: Negative for chills and fever. HENT: Negative. Respiratory: Negative for cough and shortness of breath. Cardiovascular: Negative for chest pain. Gastrointestinal: Negative for abdominal pain, diarrhea, nausea and vomiting. Genitourinary: Negative for dysuria and frequency. Musculoskeletal: Negative for myalgias. Neurological: Positive for dizziness and headaches. Negative for syncope and weakness. States she has had a couple short bouts of some mild dizziness. Does not describe vertigo. REST OF PERTINENT ROS GONE OVER AND WAS NEGATIVE. Current Outpatient Medications:     predniSONE (DELTASONE) 10 MG tablet, Take 3 tablets by mouth daily for 2 days, THEN 2 tablets daily for 2 days, THEN 1 tablet daily for 2 days. , Disp: 12 tablet, Rfl: 0    azithromycin (ZITHROMAX) 250 MG tablet, Take 1 tablet by mouth See Admin Instructions for 5 days 500mg on day 1 followed by 250mg on days 2 - 5, Disp: 6 tablet, Rfl: 0    guaiFENesin (MUCINEX) 600 MG extended release tablet, Take 1 tablet by mouth 2 times daily for 15 days, Disp: 30 tablet, Rfl: 0    Levonorgestrel (MIRENA, 52 MG, IU), by Intrauterine route, Disp: , Rfl:   Allergies   Allergen Reactions    Sulfa Antibiotics Past Medical History:   Diagnosis Date    Anemia     Depression      Past Surgical History:   Procedure Laterality Date    TONSILLECTOMY       No family history on file. Social History     Socioeconomic History    Marital status:      Spouse name: Not on file    Number of children: Not on file    Years of education: Not on file    Highest education level: Not on file   Occupational History    Not on file   Tobacco Use    Smoking status: Former Smoker     Types: Cigarettes     Quit date: 2010     Years since quittin.3    Smokeless tobacco: Never Used   Vaping Use    Vaping Use: Never used   Substance and Sexual Activity    Alcohol use: Yes     Comment: occasional wine    Drug use: No    Sexual activity: Yes     Partners: Male   Other Topics Concern    Not on file   Social History Narrative    Not on file     Social Determinants of Health     Financial Resource Strain:     Difficulty of Paying Living Expenses:    Food Insecurity:     Worried About Running Out of Food in the Last Year:     [address] in the Last Year:    Transportation Needs:     Lack of Transportation (Medical):  Lack of Transportation (Non-Medical):    Physical Activity:     Days of Exercise per Week:     Minutes of Exercise per Session:    Stress:     Feeling of Stress :    Social Connections:     Frequency of Communication with Friends and Family:     Frequency of Social Gatherings with Friends and Family:     Attends [employer] Services:     Active Member of Clubs or Organizations:     Attends Club or Organization Meetings:     Marital Status:    Intimate Partner Violence:     Fear of Current or Ex-Partner:     Emotionally Abused:     Physically Abused:     Sexually Abused:        Vitals:    21 0909   BP: 110/70   Pulse: 84   Temp: 98.4 °F (36.9 °C)   SpO2: 98%   Weight: 163 lb (73.9 kg)   Height: 5' 4\" (1.626 m)       Physical Exam  Vitals and nursing note reviewed. Constitutional:       General: She is not in acute distress. HENT:      Head: Normocephalic and atraumatic. Right Ear: Tympanic membrane, ear canal and external ear normal.      Left Ear: Tympanic membrane, ear canal and external ear normal.      Mouth/Throat:      Mouth: Mucous membranes are moist.      Pharynx: Oropharynx is clear. Eyes:      Extraocular Movements: Extraocular movements intact. Pupils: Pupils are equal, round, and reactive to light. Cardiovascular:      Rate and Rhythm: Normal rate and regular rhythm. Pulmonary:      Effort: Pulmonary effort is normal. No respiratory distress. Breath sounds: No wheezing, rhonchi or rales. Musculoskeletal:      Cervical back: Normal range of motion and neck supple. No tenderness. Skin:     General: Skin is warm and dry. Neurological:      General: No focal deficit present. Mental Status: She is alert and oriented to person, place, and time. Cranial Nerves: No cranial nerve deficit. Sensory: No sensory deficit. Motor: No weakness. Coordination: Coordination normal.      Gait: Gait normal.      Deep Tendon Reflexes: Reflexes normal.   Psychiatric:         Mood and Affect: Mood normal.         Behavior: Behavior normal.         Thought Content: Thought content normal.         Judgment: Judgment normal.                 Assessment and Plan:  [facility] was seen today for headache. Diagnoses and all orders for this visit:    Intractable headache, unspecified chronicity pattern, unspecified headache type    Other orders  -     predniSONE (DELTASONE) 10 MG tablet; Take 3 tablets by mouth daily for 2 days, THEN 2 tablets daily for 2 days, THEN 1 tablet daily for 2 days. -     ketorolac (TORADOL) injection 30 mg    Plan: Shot of Toradol. Tapering dose prednisone. Warned of potential side effects. To the emergency room over the weekend if any worsening. Notify us early next week if continuing symptoms.   Patient states that she is not pregnant. Told her if symptoms persist she will need scanned. No follow-ups on file. Seen By:  Betty Barger MD      *Document was created using voice recognition software. Note was reviewed however may contain grammatical errors.

## 2021-09-20 ENCOUNTER — OFFICE VISIT (OUTPATIENT)
Dept: FAMILY MEDICINE CLINIC | Age: 31
End: 2021-09-20
Payer: COMMERCIAL

## 2021-09-20 VITALS
BODY MASS INDEX: 27.14 KG/M2 | HEART RATE: 76 BPM | TEMPERATURE: 97.4 F | HEIGHT: 64 IN | OXYGEN SATURATION: 98 % | DIASTOLIC BLOOD PRESSURE: 68 MMHG | WEIGHT: 159 LBS | SYSTOLIC BLOOD PRESSURE: 106 MMHG

## 2021-09-20 DIAGNOSIS — M79.642 PAIN IN BOTH HANDS: ICD-10-CM

## 2021-09-20 DIAGNOSIS — R51.9 CHRONIC NONINTRACTABLE HEADACHE, UNSPECIFIED HEADACHE TYPE: ICD-10-CM

## 2021-09-20 DIAGNOSIS — F33.1 MODERATE EPISODE OF RECURRENT MAJOR DEPRESSIVE DISORDER (HCC): Primary | ICD-10-CM

## 2021-09-20 DIAGNOSIS — M79.641 PAIN IN BOTH HANDS: ICD-10-CM

## 2021-09-20 DIAGNOSIS — Z00.00 PREVENTATIVE HEALTH CARE: ICD-10-CM

## 2021-09-20 DIAGNOSIS — G89.29 CHRONIC NONINTRACTABLE HEADACHE, UNSPECIFIED HEADACHE TYPE: ICD-10-CM

## 2021-09-20 PROBLEM — M25.551 RIGHT HIP PAIN: Status: RESOLVED | Noted: 2021-05-13 | Resolved: 2021-09-20

## 2021-09-20 PROBLEM — M54.41 ACUTE RIGHT-SIDED LOW BACK PAIN WITH RIGHT-SIDED SCIATICA: Status: RESOLVED | Noted: 2021-01-18 | Resolved: 2021-09-20

## 2021-09-20 PROBLEM — R10.9 FLANK PAIN: Status: RESOLVED | Noted: 2021-01-18 | Resolved: 2021-09-20

## 2021-09-20 PROCEDURE — 99214 OFFICE O/P EST MOD 30 MIN: CPT | Performed by: FAMILY MEDICINE

## 2021-09-20 RX ORDER — BUPROPION HYDROCHLORIDE 150 MG/1
150 TABLET ORAL EVERY MORNING
Qty: 30 TABLET | Refills: 1 | Status: SHIPPED
Start: 2021-09-20 | End: 2021-11-29

## 2021-09-20 ASSESSMENT — ENCOUNTER SYMPTOMS
VOMITING: 0
NAUSEA: 0
SHORTNESS OF BREATH: 0
WHEEZING: 0

## 2021-09-20 NOTE — PROGRESS NOTES
[facility] presents to the office today for   Chief Complaint   Patient presents with   Ghazala Shanti Doctor     Previous PCP Dr. Jen Hoover    IUD in place  GYN Dr. 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 Respiratory: Negative for shortness of breath and wheezing. Cardiovascular: Negative for chest pain and palpitations. Gastrointestinal: Negative for nausea and vomiting. Genitourinary: Negative for dysuria, hematuria and urgency. Skin: Negative for rash. Neurological: Negative for dizziness and light-headedness. /68   Pulse 76   Temp 97.4 °F (36.3 °C) (Temporal)   Ht 5' 4\" (1.626 m)   Wt 159 lb (72.1 kg)   SpO2 98%   BMI 27.29 kg/m²   Physical Exam  Constitutional:       Appearance: Normal appearance. HENT:      Head: Normocephalic and atraumatic. Eyes:      Extraocular Movements: Extraocular movements intact. Conjunctiva/sclera: Conjunctivae normal.   Cardiovascular:      Rate and Rhythm: Normal rate. Heart sounds: Normal heart sounds. Pulmonary:      Effort: Pulmonary effort is normal.      Breath sounds: Normal breath sounds. Skin:     General: Skin is warm. Neurological:      Mental Status: She is alert and oriented to person, place, and time.    Psychiatric:         Mood and Affect: Mood normal.         Behavior: Behavior normal.            Current Outpatient Medications:     buPROPion (WELLBUTRIN XL) 150 MG extended release tablet, Take 1 tablet by mouth every morning, Disp: 30 tablet, Rfl: 1    Levonorgestrel (MIRENA, 52 MG, IU), by Intrauterine route, Disp: , Rfl:      Past Medical History:   Diagnosis Date    Anemia     Depression     Moderate episode of recurrent major depressive disorder ([facility] [address] 9/20/2021       Kenisha Horton was seen today for established new doctor. Diagnoses and all orders for this visit:    Moderate episode of recurrent major depressive disorder (HCC)  -     buPROPion (WELLBUTRIN XL) 150 MG extended release tablet; Take 1 tablet by mouth every morning    Chronic nonintractable headache, unspecified headache type    Pain in both hands  -     C-Reactive Protein; Future  -     Rheumatoid Factor; Future  -     Sedimentation Rate; Future    Preventative health care  -     CBC; Future  -     Comprehensive Metabolic Panel; Future  -     Lipid Panel; Future  -     TSH without Reflex;  Future       Start Wellbutrin  Labs today  Flu shot today  Recheck 6 weeks    Heriberto Puga MD

## 2021-09-28 ENCOUNTER — HOSPITAL ENCOUNTER (OUTPATIENT)
Dept: CT IMAGING | Age: 31
Discharge: HOME | End: 2021-09-30
Payer: COMMERCIAL

## 2021-09-28 ENCOUNTER — TELEPHONE (OUTPATIENT)
Dept: FAMILY MEDICINE CLINIC | Age: 31
End: 2021-09-28

## 2021-09-28 DIAGNOSIS — R51.9 ACUTE INTRACTABLE HEADACHE, UNSPECIFIED HEADACHE TYPE: ICD-10-CM

## 2021-09-28 PROCEDURE — 70450 CT HEAD/BRAIN W/O DYE: CPT

## 2021-09-29 NOTE — TELEPHONE ENCOUNTER
Called patient and notified her that the CT of her head was unremarkable. Advised patient to follow-up with Dr Cassandra Denise. Patient verbalized understanding and will follow-up.

## 2021-11-24 DIAGNOSIS — F33.1 MODERATE EPISODE OF RECURRENT MAJOR DEPRESSIVE DISORDER (HCC): ICD-10-CM

## 2021-11-29 RX ORDER — BUPROPION HYDROCHLORIDE 150 MG/1
150 TABLET ORAL EVERY MORNING
Qty: 30 TABLET | Refills: 5 | Status: SHIPPED | OUTPATIENT
Start: 2021-11-29

## 2021-11-29 NOTE — TELEPHONE ENCOUNTER
Last Appointment:  9/20/2021  Future Appointments   Date Time Provider Anthony Varela   12/9/2021 11:45 AM Shay Patino  [address]

## 2021-12-04 ENCOUNTER — OFFICE VISIT (OUTPATIENT)
Dept: FAMILY MEDICINE CLINIC | Age: 31
End: 2021-12-04
Payer: COMMERCIAL

## 2021-12-04 VITALS
HEART RATE: 102 BPM | WEIGHT: 166 LBS | DIASTOLIC BLOOD PRESSURE: 78 MMHG | BODY MASS INDEX: 28.34 KG/M2 | TEMPERATURE: 98.7 F | SYSTOLIC BLOOD PRESSURE: 120 MMHG | HEIGHT: 64 IN | OXYGEN SATURATION: 98 %

## 2021-12-04 DIAGNOSIS — J01.40 ACUTE NON-RECURRENT PANSINUSITIS: ICD-10-CM

## 2021-12-04 DIAGNOSIS — R05.9 COUGH: Primary | ICD-10-CM

## 2021-12-04 PROCEDURE — 99213 OFFICE O/P EST LOW 20 MIN: CPT | Performed by: FAMILY MEDICINE

## 2021-12-04 RX ORDER — FLUTICASONE PROPIONATE 50 MCG
1 SPRAY, SUSPENSION (ML) NASAL DAILY
COMMUNITY
End: 2021-12-09

## 2021-12-04 RX ORDER — PREDNISONE 10 MG/1
TABLET ORAL
Qty: 18 TABLET | Refills: 0 | Status: SHIPPED
Start: 2021-12-04 | End: 2021-12-09

## 2021-12-04 RX ORDER — AMOXICILLIN 500 MG/1
500 CAPSULE ORAL 3 TIMES DAILY
Qty: 30 CAPSULE | Refills: 0 | Status: SHIPPED | OUTPATIENT
Start: 2021-12-04 | End: 2021-12-14

## 2021-12-04 RX ORDER — CETIRIZINE HYDROCHLORIDE 10 MG/1
10 TABLET ORAL DAILY
COMMUNITY
End: 2021-12-09

## 2021-12-04 ASSESSMENT — ENCOUNTER SYMPTOMS
TROUBLE SWALLOWING: 0
BACK PAIN: 0
BLOOD IN STOOL: 0
CHEST TIGHTNESS: 0
SINUS PAIN: 0
SORE THROAT: 1
DIARRHEA: 0
SINUS PRESSURE: 1
EYE REDNESS: 0
EYE DISCHARGE: 0
EYE PAIN: 0
COUGH: 1
ABDOMINAL PAIN: 0
SHORTNESS OF BREATH: 0
NAUSEA: 0
VOMITING: 0
ALLERGIC/IMMUNOLOGIC NEGATIVE: 1
PHOTOPHOBIA: 0

## 2021-12-04 NOTE — PROGRESS NOTES
21  Esteban Copeland [facility] : 1990 Sex: female  Age: 36 y.o. Assessment and Plan:  Walter Velasquez was seen today for pharyngitis, drainage, other and nasal congestion. Diagnoses and all orders for this visit:    Cough  -     COVID-19 Ambulatory; Future    Acute non-recurrent pansinusitis  -     amoxicillin (AMOXIL) 500 MG capsule; Take 1 capsule by mouth 3 times daily for 10 days  -     predniSONE (DELTASONE) 10 MG tablet; Take 3 tablets by mouth daily for 3 days, THEN 2 tablets daily for 3 days, THEN 1 tablet daily for 3 days. Tylenol, fluids, rest, cool mist, call, recheck here or to ER immediately if condition worsens      Return if symptoms worsen or fail to improve. Chief Complaint   Patient presents with    Pharyngitis     x 1 week 21    Drainage     Taking flonase and Zyrtec     Other     Rapid home Covid test 21 was negative.  Nasal Congestion       Congestion, pressure, drainage, facial tenderness, mild headache, onset 3 days ago. She took a Covid test at home which was negative on Thursday. Her daughter just got over strep. Denies fever, chills, diaphoresis, nausea, vomiting, decreased oral intake. Denies other GI or  complaints. OTC treatments minimally effective. Has an IUD in place, not having regular periods      Review of Systems   Constitutional: Negative for appetite change, fatigue and unexpected weight change. HENT: Positive for congestion, sinus pressure and sore throat. Negative for ear pain, hearing loss, sinus pain and trouble swallowing. Eyes: Negative for photophobia, pain, discharge and redness. Respiratory: Positive for cough. Negative for chest tightness and shortness of breath. Cardiovascular: Negative for chest pain, palpitations and leg swelling. Gastrointestinal: Negative for abdominal pain, blood in stool, diarrhea, nausea and vomiting. Endocrine: Negative.     Genitourinary: Negative for dysuria, flank pain, frequency and hematuria. Musculoskeletal: Negative for arthralgias, back pain, joint swelling and myalgias. Skin: Negative. Allergic/Immunologic: Negative. Neurological: Negative for dizziness, seizures, syncope, weakness, light-headedness, numbness and headaches. Hematological: Negative for adenopathy. Does not bruise/bleed easily. Psychiatric/Behavioral: Negative. Current Outpatient Medications:     fluticasone (FLONASE) 50 MCG/ACT nasal spray, 1 spray by Each Nostril route daily, Disp: , Rfl:     cetirizine (ZYRTEC) 10 MG tablet, Take 10 mg by mouth daily, Disp: , Rfl:     amoxicillin (AMOXIL) 500 MG capsule, Take 1 capsule by mouth 3 times daily for 10 days, Disp: 30 capsule, Rfl: 0    predniSONE (DELTASONE) 10 MG tablet, Take 3 tablets by mouth daily for 3 days, THEN 2 tablets daily for 3 days, THEN 1 tablet daily for 3 days. , Disp: 18 tablet, Rfl: 0    buPROPion (WELLBUTRIN XL) 150 MG extended release tablet, take 1 tablet by mouth every morning, Disp: 30 tablet, Rfl: 5    Levonorgestrel (MIRENA, 52 MG, IU), by Intrauterine route, Disp: , Rfl:   Allergies   Allergen Reactions    Sulfa Antibiotics        Past Medical History:   Diagnosis Date    Anemia     Depression     [facility]     Past Surgical History:   Procedure Laterality Date    TONSILLECTOMY       No family history on file.   Social History     Socioeconomic History    Marital status:      Spouse name: Not on file    Number of children: Not on file    Years of education: Not on file    Highest education level: Not on file   Occupational History    Not on file   Tobacco Use    Smoking status: Former Smoker     Types: Cigarettes     Quit date: 2010     Years since quittin.6    Smokeless tobacco: Never Used   Vaping Use    Vaping Use: Never used   Substance and Sexual Activity    Alcohol use: Yes     Comment: occasional wine    Drug use: No    Sexual activity: Yes     Partners: Male   Other Topics Concern    Not on file   Social History Narrative    Not on file     Social Determinants of Health     Financial Resource Strain:     Difficulty of Paying Living Expenses: Not on file   Food Insecurity:     Worried About [address] in the Last Year: Not on file    [place] of Food in the Last Year: Not on file   Transportation Needs:     Lack of Transportation (Medical): Not on file    Lack of Transportation (Non-Medical): Not on file   Physical Activity:     Days of Exercise per Week: Not on file    Minutes of Exercise per Session: Not on file   Stress:     Feeling of Stress : Not on file   Social Connections:     Frequency of Communication with Friends and Family: Not on file    Frequency of Social Gatherings with Friends and Family: Not on file    Attends Sikhism Services: Not on file    Active Member of [address] [place] [id number] or Organizations: Not on file    Attends Club or Organization Meetings: Not on file    Marital Status: Not on file   Intimate Partner Violence:     Fear of Current or Ex-Partner: Not on file    Emotionally Abused: Not on file    Physically Abused: Not on file    Sexually Abused: Not on file   Housing Stability:     Unable to Pay for Housing in the Last Year: Not on file    Number of [place] in the Last Year: Not on file    Unstable Housing in the Last Year: Not on file       Vitals:    12/04/21 0806   BP: 120/78   Pulse: 102   Temp: 98.7 °F (37.1 °C)   SpO2: 98%   Weight: 166 lb (75.3 kg)   Height: 5' 4\" (1.626 m)       Physical Exam  Vitals and nursing note reviewed. Constitutional:       Appearance: She is well-developed. HENT:      Head: Atraumatic. Right Ear: External ear normal.      Left Ear: External ear normal.      Nose: Congestion present. Mouth/Throat:      Pharynx: Posterior oropharyngeal erythema present. No oropharyngeal exudate.    Eyes:      Conjunctiva/sclera: Conjunctivae normal. Pupils: Pupils are equal, round, and reactive to light. Neck:      Thyroid: No thyromegaly. Trachea: No tracheal deviation. Cardiovascular:      Rate and Rhythm: Normal rate and regular rhythm. Heart sounds: No murmur heard. No friction rub. No gallop. Pulmonary:      Effort: Pulmonary effort is normal. No respiratory distress. Breath sounds: Normal breath sounds. Abdominal:      General: Bowel sounds are normal.      Palpations: Abdomen is soft. Musculoskeletal:         General: No tenderness or deformity. Normal range of motion. Cervical back: Normal range of motion and neck supple. Lymphadenopathy:      Cervical: No cervical adenopathy. Skin:     General: Skin is warm and dry. Capillary Refill: Capillary refill takes less than 2 seconds. Findings: No rash. Neurological:      Mental Status: She is alert and oriented to person, place, and time. Sensory: No sensory deficit. Motor: No abnormal muscle tone.       Coordination: Coordination normal.      Deep Tendon Reflexes: Reflexes normal.             Seen By:  Araceli Weller,

## 2021-12-05 DIAGNOSIS — R05.9 COUGH: ICD-10-CM

## 2021-12-05 LAB — SOURCE: NORMAL

## 2021-12-06 ENCOUNTER — TELEPHONE (OUTPATIENT)
Dept: FAMILY MEDICINE CLINIC | Age: 31
End: 2021-12-06

## 2021-12-06 ENCOUNTER — OFFICE VISIT (OUTPATIENT)
Dept: FAMILY MEDICINE CLINIC | Age: 31
End: 2021-12-06
Payer: COMMERCIAL

## 2021-12-06 VITALS
BODY MASS INDEX: 28.49 KG/M2 | OXYGEN SATURATION: 98 % | WEIGHT: 166 LBS | TEMPERATURE: 97.5 F | DIASTOLIC BLOOD PRESSURE: 76 MMHG | HEART RATE: 95 BPM | SYSTOLIC BLOOD PRESSURE: 118 MMHG

## 2021-12-06 DIAGNOSIS — R06.02 SHORTNESS OF BREATH: Primary | ICD-10-CM

## 2021-12-06 DIAGNOSIS — R06.02 SHORTNESS OF BREATH: ICD-10-CM

## 2021-12-06 LAB
D DIMER: 279 NG/ML DDU
SARS-COV-2, PCR: NOT DETECTED

## 2021-12-06 PROCEDURE — 99213 OFFICE O/P EST LOW 20 MIN: CPT | Performed by: FAMILY MEDICINE

## 2021-12-06 NOTE — PROGRESS NOTES
[address] [facility] presents to the office today for   Chief Complaint   Patient presents with    Shortness of Breath     Shortness of breath  Dry cough due to feeling like she is running out of air  hoarseness and congestion improved  Home COVID neg  COVID PCR Negative  She admits could be anxiety      Review of Systems     /76   Pulse 95   Temp 97.5 °F (36.4 °C) (Temporal)   Wt 166 lb (75.3 kg)   SpO2 98%   BMI 28.49 kg/m²   Physical Exam  Constitutional:       Appearance: Normal appearance. HENT:      Head: Normocephalic and atraumatic. Eyes:      Extraocular Movements: Extraocular movements intact. Conjunctiva/sclera: Conjunctivae normal.   Cardiovascular:      Rate and Rhythm: Normal rate. Heart sounds: Normal heart sounds. Pulmonary:      Effort: Pulmonary effort is normal.      Breath sounds: Normal breath sounds. Skin:     General: Skin is warm. Neurological:      Mental Status: She is alert and oriented to person, place, and time. Psychiatric:         Mood and Affect: Mood normal.         Behavior: Behavior normal.            Current Outpatient Medications:     fluticasone (FLONASE) 50 MCG/ACT nasal spray, 1 spray by Each Nostril route daily, Disp: , Rfl:     cetirizine (ZYRTEC) 10 MG tablet, Take 10 mg by mouth daily, Disp: , Rfl:     amoxicillin (AMOXIL) 500 MG capsule, Take 1 capsule by mouth 3 times daily for 10 days, Disp: 30 capsule, Rfl: 0    predniSONE (DELTASONE) 10 MG tablet, Take 3 tablets by mouth daily for 3 days, THEN 2 tablets daily for 3 days, THEN 1 tablet daily for 3 days. , Disp: 18 tablet, Rfl: 0    buPROPion (WELLBUTRIN XL) 150 MG extended release tablet, take 1 tablet by mouth every morning, Disp: 30 tablet, Rfl: 5    Levonorgestrel (MIRENA, 52 MG, IU), by Intrauterine route, Disp: , Rfl:      Past Medical History:   Diagnosis Date    Anemia     Depression     Moderate episode of recurrent major depressive disorder ([facility] [address] 9/20/2021       [place] was seen today for shortness of breath. Diagnoses and all orders for this visit:    Shortness of breath  -     XR CHEST (2 VW); Future  -     D-DIMER, QUANTITATIVE;  Future       Check xray  D-dimer to rule out PE although low suspicion    Jamel Julian MD

## 2021-12-06 NOTE — TELEPHONE ENCOUNTER
reji calling for lab result and xray result. States you were ruling out a PE please advise  She would like a result before end of day.  Please call her or send a Brandcast[employer] message

## 2021-12-07 ENCOUNTER — PATIENT MESSAGE (OUTPATIENT)
Dept: FAMILY MEDICINE CLINIC | Age: 31
End: 2021-12-07

## 2021-12-07 NOTE — TELEPHONE ENCOUNTER
From: Claire Ross  Sent: 12/7/2021 8:33 AM EST  To: [facility] Lucy Merrill  Clinical Pool  Subject: COVID negative    Thank you, Dr Rudolph Olvera. I don't feel much different. I coughed again all night. If I sit and not talk - I do not cough. As soon as I try to talk and/or move around. . I have to cough and I feel winded. I am finding myself fighting to not cough, which could be causing me to feel extra winded sometimes. No one wants to cough in 2021. Could this be a side effect of the Prednisone? Honestly I am having a really have a hard time believing this is anxiety. .. I have lived with anxiety my entire life .

## 2021-12-09 ENCOUNTER — OFFICE VISIT (OUTPATIENT)
Dept: FAMILY MEDICINE CLINIC | Age: 31
End: 2021-12-09
Payer: COMMERCIAL

## 2021-12-09 VITALS
BODY MASS INDEX: 28.32 KG/M2 | HEART RATE: 90 BPM | SYSTOLIC BLOOD PRESSURE: 110 MMHG | WEIGHT: 165 LBS | TEMPERATURE: 97.2 F | OXYGEN SATURATION: 98 % | DIASTOLIC BLOOD PRESSURE: 72 MMHG

## 2021-12-09 DIAGNOSIS — R05.9 COUGH: ICD-10-CM

## 2021-12-09 DIAGNOSIS — F33.1 MODERATE EPISODE OF RECURRENT MAJOR DEPRESSIVE DISORDER (HCC): Primary | ICD-10-CM

## 2021-12-09 PROCEDURE — 99213 OFFICE O/P EST LOW 20 MIN: CPT | Performed by: FAMILY MEDICINE

## 2021-12-09 NOTE — PROGRESS NOTES
[address] [facility] presents to the office today for   Chief Complaint   Patient presents with    Depression     follow up     Cough  Feeling better off prednisone    Depression  Wellbutrin is helpful  No side effects  No SI    Review of Systems     /72   Pulse 90   Temp 97.2 °F (36.2 °C) (Temporal)   Wt 165 lb (74.8 kg)   SpO2 98%   BMI 28.32 kg/m²   Physical Exam  Constitutional:       Appearance: Normal appearance. HENT:      Head: Normocephalic and atraumatic. Eyes:      Extraocular Movements: Extraocular movements intact. Conjunctiva/sclera: Conjunctivae normal.   Cardiovascular:      Rate and Rhythm: Normal rate. Heart sounds: Normal heart sounds. Pulmonary:      Effort: Pulmonary effort is normal.      Breath sounds: Normal breath sounds. Skin:     General: Skin is warm. Neurological:      Mental Status: She is alert and oriented to person, place, and time. Psychiatric:         Mood and Affect: Mood normal.         Behavior: Behavior normal.            Current Outpatient Medications:     amoxicillin (AMOXIL) 500 MG capsule, Take 1 capsule by mouth 3 times daily for 10 days, Disp: 30 capsule, Rfl: 0    buPROPion (WELLBUTRIN XL) 150 MG extended release tablet, take 1 tablet by mouth every morning, Disp: 30 tablet, Rfl: 5    Levonorgestrel (MIRENA, 52 MG, IU), by Intrauterine route, Disp: , Rfl:      Past Medical History:   Diagnosis Date    Anemia     Depression     Moderate episode of recurrent major depressive disorder ([address] 9/20/2021       [facility] was seen today for depression.     Diagnoses and all orders for this visit:    Moderate episode of recurrent major depressive disorder (HCC)    Cough       Continue Wellbutrin  Recheck 6 months    Christopher Santana MD

## 2022-07-26 ENCOUNTER — OFFICE VISIT (OUTPATIENT)
Dept: FAMILY MEDICINE CLINIC | Age: 32
End: 2022-07-26
Payer: COMMERCIAL

## 2022-07-26 VITALS
SYSTOLIC BLOOD PRESSURE: 118 MMHG | TEMPERATURE: 97.7 F | OXYGEN SATURATION: 98 % | RESPIRATION RATE: 18 BRPM | HEART RATE: 82 BPM | DIASTOLIC BLOOD PRESSURE: 74 MMHG | BODY MASS INDEX: 27.45 KG/M2 | WEIGHT: 160.8 LBS | HEIGHT: 64 IN

## 2022-07-26 DIAGNOSIS — B34.9 VIRAL ILLNESS: Primary | ICD-10-CM

## 2022-07-26 DIAGNOSIS — Z20.822 EXPOSURE TO COVID-19 VIRUS: ICD-10-CM

## 2022-07-26 LAB
Lab: NORMAL
PERFORMING INSTRUMENT: NORMAL
QC PASS/FAIL: NORMAL
SARS-COV-2, POC: NORMAL

## 2022-07-26 PROCEDURE — 99213 OFFICE O/P EST LOW 20 MIN: CPT | Performed by: NURSE PRACTITIONER

## 2022-07-26 PROCEDURE — 87426 SARSCOV CORONAVIRUS AG IA: CPT | Performed by: NURSE PRACTITIONER

## 2022-07-26 NOTE — PROGRESS NOTES
Chief Complaint   Pharyngitis (Patient states that she has had symptoms since Sunday evening. . two people in her house have covid. ), Headache, Chills, and Generalized Body Aches    History of Present Illness   Source of history provided by:  patient. Emily Miller is a 29 y.o. old female who presents to walk-in with complaints of Fever, Chills, Generalized Body Aches, Headache, Pharyngitis, Rhinorrhea, Nasal Congestion, Post nasal drip, and Fatigue x 3 days. States symptoms have worsened since onset. Has been taking Advil and antihistamines with symptomatic relief. Currently denies any Shortness of breath, Nausea, Vomiting, Chest Pain, Abdominal Pain, Rash, or Lethargy. Denies any hx of asthma. Denies tobacco use. Patient denies history of COVID-19. Patient is  vaccinated for COVID-19.  and son both positive for COVID at home. ROS   Pertinent positives and negatives are stated within HPI, all other systems reviewed and are negative. Past Medical History:  has a past medical history of Anemia, Depression, and Moderate episode of recurrent major depressive disorder ([facility] [address] Past Surgical History:  has a past surgical history that includes Tonsillectomy. Social History:  reports that she quit smoking about 12 years ago. Her smoking use included cigarettes. She has never used smokeless tobacco. She reports current alcohol use. She reports that she does not use drugs. Family History: family history is not on file. Allergies: Sulfa antibiotics    Physical Exam   Vital Signs:  /74   Pulse 82   Temp 97.7 °F (36.5 °C)   Resp 18   Ht 5' 4\" (1.626 m)   Wt 160 lb 12.8 oz (72.9 kg)   SpO2 98%   BMI 27.60 kg/m²    Oxygen Saturation Interpretation: Normal.    Constitutional:  Alert, development consistent with age. NAD. Head:  NC/NT. Airway patent. Ears: TMs clear bilaterally. Canals without exudate or swelling bilaterally.   Mouth: Posterior pharynx with mild erythema and clear postnasal drip. There is no tonsillar hypertrophy or exudate. Neck:  Normal ROM. Supple. There is no anterior cervical adenopathy noted. Lungs: CTAB without wheezes, rales, or rhonchi. CV:  Regular rate and rhythm, normal heart sounds, without pathological murmurs, ectopy, gallops, or rubs. Skin:  Normal turgor. Warm, dry, without visible rash. Lymphatic: No lymphangitis or adenopathy noted. Neurological:  Oriented. Motor functions intact. Lab / Imaging Results   (All laboratory and radiology results have been personally reviewed by myself)  Labs:  Results for orders placed or performed in visit on 07/26/22   POCT COVID-19, Antigen   Result Value Ref Range    SARS-COV-2, POC Not-Detected Not Detected    Lot Number [id number]     QC Pass/Fail pass     Performing Instrument BD Veritor        Imaging: All Radiology results interpreted by Radiologist unless otherwise noted. No results found. Medical Decision Making   Pt non-toxic, in no apparent distress and stable at time of discharge. Assessment/Plan   Cira Woodson was seen today for pharyngitis, headache, chills and generalized body aches. Diagnoses and all orders for this visit:    Viral illness    Exposure to COVID-19 virus  -     COVID-19 Ambulatory; Future  -     POCT COVID-19, Antigen    Rapid COVID-19 positive in office, pt advised of results. COVID PCR obtained and sent to lab, will advise of results once available. Increase fluids and rest. Symptomatic relief discussed including Tylenol prn pain/fever. Schedule f/u with PCP in 7-10 days if symptoms persist. ED sooner if symptoms worsen or change. ED immediately with high or refractory fever, progressive SOB, dyspnea, CP, calf pain/swelling, shaking chills, vomiting, abdominal pain, lethargy, flank pain, or decreased urinary output. Pt verbalizes understanding and is in agreement with plan of care. All questions answered. Return if symptoms worsen or fail to improve.      Electronically signed by JEAN-PIERRE Roberson CNP   DD: 7/26/22    **This report was transcribed using voice recognition software. Every effort was made to ensure accuracy; however, inadvertent computerized transcription errors may be present.

## 2022-07-27 LAB — SARS-COV-2, PCR: DETECTED

## 2022-12-01 ENCOUNTER — OFFICE VISIT (OUTPATIENT)
Dept: FAMILY MEDICINE CLINIC | Age: 32
End: 2022-12-01
Payer: COMMERCIAL

## 2022-12-01 VITALS
BODY MASS INDEX: 27.66 KG/M2 | WEIGHT: 162 LBS | RESPIRATION RATE: 15 BRPM | HEART RATE: 84 BPM | TEMPERATURE: 97.7 F | OXYGEN SATURATION: 98 % | SYSTOLIC BLOOD PRESSURE: 120 MMHG | HEIGHT: 64 IN | DIASTOLIC BLOOD PRESSURE: 80 MMHG

## 2022-12-01 DIAGNOSIS — R10.32 LEFT LOWER QUADRANT ABDOMINAL PAIN: Primary | ICD-10-CM

## 2022-12-01 DIAGNOSIS — R10.32 LEFT LOWER QUADRANT ABDOMINAL PAIN: ICD-10-CM

## 2022-12-01 DIAGNOSIS — Z00.00 PREVENTATIVE HEALTH CARE: ICD-10-CM

## 2022-12-01 LAB
ALBUMIN SERPL-MCNC: 4.6 G/DL (ref 3.5–5.2)
ALP BLD-CCNC: 48 U/L (ref 35–104)
ALT SERPL-CCNC: 10 U/L (ref 0–32)
ANION GAP SERPL CALCULATED.3IONS-SCNC: 14 MMOL/L (ref 7–16)
AST SERPL-CCNC: 15 U/L (ref 0–31)
BASOPHILS ABSOLUTE: 0.06 E9/L (ref 0–0.2)
BASOPHILS RELATIVE PERCENT: 1 % (ref 0–2)
BILIRUB SERPL-MCNC: 1.2 MG/DL (ref 0–1.2)
BUN BLDV-MCNC: 11 MG/DL (ref 6–20)
CALCIUM SERPL-MCNC: 9.2 MG/DL (ref 8.6–10.2)
CHLORIDE BLD-SCNC: 107 MMOL/L (ref 98–107)
CO2: 24 MMOL/L (ref 22–29)
CREAT SERPL-MCNC: 0.8 MG/DL (ref 0.5–1)
EOSINOPHILS ABSOLUTE: 0.22 E9/L (ref 0.05–0.5)
EOSINOPHILS RELATIVE PERCENT: 3.6 % (ref 0–6)
GFR SERPL CREATININE-BSD FRML MDRD: >60 ML/MIN/1.73
GLUCOSE BLD-MCNC: 86 MG/DL (ref 74–99)
HCT VFR BLD CALC: 41.6 % (ref 34–48)
HEMOGLOBIN: 14 G/DL (ref 11.5–15.5)
IMMATURE GRANULOCYTES #: 0.02 E9/L
IMMATURE GRANULOCYTES %: 0.3 % (ref 0–5)
LIPASE: 26 U/L (ref 13–60)
LYMPHOCYTES ABSOLUTE: 1.47 E9/L (ref 1.5–4)
LYMPHOCYTES RELATIVE PERCENT: 23.9 % (ref 20–42)
MCH RBC QN AUTO: 31.1 PG (ref 26–35)
MCHC RBC AUTO-ENTMCNC: 33.7 % (ref 32–34.5)
MCV RBC AUTO: 92.4 FL (ref 80–99.9)
MONOCYTES ABSOLUTE: 0.68 E9/L (ref 0.1–0.95)
MONOCYTES RELATIVE PERCENT: 11.1 % (ref 2–12)
NEUTROPHILS ABSOLUTE: 3.7 E9/L (ref 1.8–7.3)
NEUTROPHILS RELATIVE PERCENT: 60.1 % (ref 43–80)
PDW BLD-RTO: 11.7 FL (ref 11.5–15)
PLATELET # BLD: 252 E9/L (ref 130–450)
PMV BLD AUTO: 11.5 FL (ref 7–12)
POTASSIUM SERPL-SCNC: 4.3 MMOL/L (ref 3.5–5)
RBC # BLD: 4.5 E12/L (ref 3.5–5.5)
SODIUM BLD-SCNC: 145 MMOL/L (ref 132–146)
TOTAL PROTEIN: 7.1 G/DL (ref 6.4–8.3)
TSH SERPL DL<=0.05 MIU/L-ACNC: 0.98 UIU/ML (ref 0.27–4.2)
WBC # BLD: 6.2 E9/L (ref 4.5–11.5)

## 2022-12-01 PROCEDURE — 99214 OFFICE O/P EST MOD 30 MIN: CPT | Performed by: FAMILY MEDICINE

## 2022-12-01 NOTE — PROGRESS NOTES
[address] [facility] presents to the office today for   Chief Complaint   Patient presents with    Other       Left sided rib pain  Fell 7.5 weeks ago running softball bases  Did not have pain up until a few weeks ago  Told questionable rib fracture at outside urgent care? Still having left sided abdominal pain  Feeling a stuck sensation  No bowel, bladder, or vomiting  No change in exercise  It is the same as it was  No unintentional weight loss      Review of Systems     /80   Pulse 84   Temp 97.7 °F (36.5 °C) (Temporal)   Resp 15   Ht 5' 4\" (1.626 m)   Wt 162 lb (73.5 kg)   SpO2 98%   BMI 27.81 kg/m²   Physical Exam  Constitutional:       Appearance: Normal appearance. HENT:      Head: Normocephalic and atraumatic. Eyes:      Extraocular Movements: Extraocular movements intact. Conjunctiva/sclera: Conjunctivae normal.   Cardiovascular:      Rate and Rhythm: Normal rate. Heart sounds: Normal heart sounds. Pulmonary:      Effort: Pulmonary effort is normal.      Breath sounds: Normal breath sounds. Skin:     General: Skin is warm. Neurological:      Mental Status: She is alert and oriented to person, place, and time. Psychiatric:         Mood and Affect: Mood normal.         Behavior: Behavior normal.          Current Outpatient Medications:     buPROPion (WELLBUTRIN XL) 150 MG extended release tablet, take 1 tablet by mouth every morning, Disp: 30 tablet, Rfl: 5    Levonorgestrel (MIRENA, 52 MG, IU), by Intrauterine route, Disp: , Rfl:      Past Medical History:   Diagnosis Date    Anemia     Depression     Mod[facility] 9/20/2021       Donella Cheadle was seen today for other. Diagnoses and all orders for this visit:    Left lower quadrant abdominal pain  -     CBC with Auto Differential; Future  -     Comprehensive Metabolic Panel; Future  -     Lipase;  Future    Preventative health care  -     CBC with Auto Differential; Future  -     Comprehensive Metabolic Panel; Future  -     TSH;  Future       Anali Wong MD

## 2022-12-07 DIAGNOSIS — R10.32 LEFT LOWER QUADRANT ABDOMINAL PAIN: Primary | ICD-10-CM

## 2023-02-03 DIAGNOSIS — F33.1 MODERATE EPISODE OF RECURRENT MAJOR DEPRESSIVE DISORDER (HCC): ICD-10-CM

## 2023-02-03 RX ORDER — BUPROPION HYDROCHLORIDE 150 MG/1
150 TABLET ORAL EVERY MORNING
Qty: 30 TABLET | Refills: 5 | Status: SHIPPED | OUTPATIENT
Start: 2023-02-03

## 2023-02-09 ENCOUNTER — INITIAL CONSULT (OUTPATIENT)
Dept: GASTROENTEROLOGY | Age: 33
End: 2023-02-09
Payer: COMMERCIAL

## 2023-02-09 VITALS
OXYGEN SATURATION: 99 % | WEIGHT: 162 LBS | HEIGHT: 64 IN | RESPIRATION RATE: 16 BRPM | TEMPERATURE: 97 F | HEART RATE: 86 BPM | DIASTOLIC BLOOD PRESSURE: 76 MMHG | SYSTOLIC BLOOD PRESSURE: 126 MMHG | BODY MASS INDEX: 27.66 KG/M2

## 2023-02-09 DIAGNOSIS — R10.9 LEFT SIDED ABDOMINAL PAIN: Primary | ICD-10-CM

## 2023-02-09 PROCEDURE — 99202 OFFICE O/P NEW SF 15 MIN: CPT | Performed by: NURSE PRACTITIONER

## 2023-02-09 NOTE — PROGRESS NOTES
Shwetha Rapp (:  1990) is a 29 y.o. female, here for evaluation of the following chief complaint(s):  New Patient (Referred by Dr Brit Krause for Lower Left Abdominal Pain )      SUBJECTIVE/OBJECTIVE:  HPI:    Markel Voss is a very pleasant 29year old female that presents today with complaints of upper left sided abdominal pain that presented in October after a rib fracture    Patient presented to the urgent care for the diagnosis of rib fracture  Since that time, the pain is random and worse with sleeping on her left side  Worse when she is active  Not related to BMs or meals  Denies acid reflux  Not a smoker  No routine NSAID use  Social alcohol only       ROS:  General: Patient denies n/v/f/c or weight loss. HEENT: Patient denies persistent postnasal drip, scleral icterus, drooling, persistent bleeding from nose/mouth. Resp: Patient denies SOB, wheezing, productive cough. Cards: Patient denies CP, palpitations, significant edema  GI: As above. Derm: Patient denies jaundice/rashes. Musc: Patient denies diffuse/irregular joint swelling or myalgias. Objective   Wt Readings from Last 3 Encounters:   23 162 lb (73.5 kg)   22 162 lb (73.5 kg)   22 160 lb 12.8 oz (72.9 kg)     Temp Readings from Last 3 Encounters:   23 97 °F (36.1 °C) (Temporal)   22 97.7 °F (36.5 °C) (Temporal)   22 97.7 °F (36.5 °C)     BP Readings from Last 3 Encounters:   23 126/76   22 120/80   22 118/74     Pulse Readings from Last 3 Encounters:   23 86   22 84   22 82        Physical Exam  Constitutional:       Appearance: Normal appearance. Abdominal:      General: Bowel sounds are normal.      Comments: Left sided mid and upper abdominal tenderness   Neurological:      Mental Status: She is alert.        Past Medical History:   Diagnosis Date    Anemia     Depression     Moderate episode of recurrent major depressive disorder ([facility] [address] 2021      Past Surgical History:   Procedure Laterality Date    TONSILLECTOMY        Family History   Problem Relation Age of Onset    Cancer Mother         cervical    Osteoporosis Mother     Thyroid Disease Sister         Lab Results   Component Value Date    WBC 6.2 12/01/2022    HGB 14.0 12/01/2022    HCT 41.6 12/01/2022    MCV 92.4 12/01/2022     12/01/2022      Lab Results   Component Value Date     12/01/2022    K 4.3 12/01/2022     12/01/2022    CO2 24 12/01/2022    BUN 11 12/01/2022    CREATININE 0.8 12/01/2022    GLUCOSE 86 12/01/2022    CALCIUM 9.2 12/01/2022    PROT 7.1 12/01/2022    LABALBU 4.6 12/01/2022    BILITOT 1.2 12/01/2022    ALKPHOS 48 12/01/2022    AST 15 12/01/2022    ALT 10 12/01/2022    LABGLOM >60 12/01/2022    GFRAA >60 09/20/2021                       ASSESSMENT/PLAN:    1. Left sided abdominal pain  -     CT ABDOMEN PELVIS W IV CONTRAST Additional Contrast? None; Future  -     BUN; Future  -     Creatinine; Future    -will proceed with CT scan of the abdomen with contrast  -further management pending study results    Return for Follow up after CT scan. An electronic signature was used to authenticate this note.     --JEAN-PIERRE Chávez - CNP

## 2023-02-16 ENCOUNTER — HOSPITAL ENCOUNTER (OUTPATIENT)
Dept: CT IMAGING | Age: 33
Discharge: HOME | End: 2023-02-18
Payer: COMMERCIAL

## 2023-02-16 DIAGNOSIS — R10.9 LEFT SIDED ABDOMINAL PAIN: ICD-10-CM

## 2023-02-16 PROCEDURE — 74177 CT ABD & PELVIS W/CONTRAST: CPT

## 2023-02-16 PROCEDURE — 6360000004 HC RX CONTRAST MEDICATION: Performed by: RADIOLOGY

## 2023-02-16 RX ADMIN — IOPAMIDOL 75 ML: 755 INJECTION, SOLUTION INTRAVENOUS at 07:27

## 2023-05-22 ENCOUNTER — TELEPHONE (OUTPATIENT)
Dept: FAMILY MEDICINE CLINIC | Age: 33
End: 2023-05-22

## 2023-06-19 ENCOUNTER — HOSPITAL ENCOUNTER (OUTPATIENT)
Dept: NEUROLOGY | Age: 33
Discharge: HOME | End: 2023-06-19
Payer: COMMERCIAL

## 2023-06-19 PROCEDURE — 95911 NRV CNDJ TEST 9-10 STUDIES: CPT

## 2023-06-19 PROCEDURE — 95886 MUSC TEST DONE W/N TEST COMP: CPT

## 2023-06-19 NOTE — PROCEDURES
as prolonged peak latency of the right sural nerves present and significance is unknown. This may be technical issues and in and of themselves, do not relate to a specific diagnosis. Insertional activity is within normal limits for the sampled musculature. Please see the above summarization table for full disclosure examination. No diagnostic evidence of a primary myopathy nor of a proximal or peripheral neuropathy. No diagnostic evidence of a anterior horn cell disorder nor of a myelinating disorder. Recommendations: Further evaluation of patient's symptoms may consider addition of imaging test as well as logical evaluation. Please see letter. Thank you. Clinical correlation recommended.         Electronically signed by Radha Romo MD on [birth year]1951 at 5:26 PM

## 2023-08-22 DIAGNOSIS — F33.1 MODERATE EPISODE OF RECURRENT MAJOR DEPRESSIVE DISORDER (HCC): ICD-10-CM

## 2023-08-22 RX ORDER — BUPROPION HYDROCHLORIDE 150 MG/1
150 TABLET ORAL EVERY MORNING
Qty: 30 TABLET | Refills: 3 | OUTPATIENT
Start: 2023-08-22

## 2023-10-02 ENCOUNTER — APPOINTMENT (OUTPATIENT)
Dept: CT IMAGING | Age: 33
End: 2023-10-02
Payer: COMMERCIAL

## 2023-10-02 ENCOUNTER — OFFICE VISIT (OUTPATIENT)
Dept: FAMILY MEDICINE CLINIC | Age: 33
End: 2023-10-02
Payer: COMMERCIAL

## 2023-10-02 ENCOUNTER — HOSPITAL ENCOUNTER (OUTPATIENT)
Age: 33
Discharge: HOME | End: 2023-10-02
Payer: COMMERCIAL

## 2023-10-02 ENCOUNTER — HOSPITAL ENCOUNTER (EMERGENCY)
Age: 33
Discharge: HOME | End: 2023-10-02
Attending: EMERGENCY MEDICINE
Payer: COMMERCIAL

## 2023-10-02 VITALS
HEART RATE: 88 BPM | TEMPERATURE: 97.9 F | RESPIRATION RATE: 16 BRPM | OXYGEN SATURATION: 100 % | SYSTOLIC BLOOD PRESSURE: 131 MMHG | DIASTOLIC BLOOD PRESSURE: 77 MMHG

## 2023-10-02 VITALS
DIASTOLIC BLOOD PRESSURE: 72 MMHG | BODY MASS INDEX: 28.27 KG/M2 | HEART RATE: 82 BPM | HEIGHT: 64 IN | RESPIRATION RATE: 18 BRPM | SYSTOLIC BLOOD PRESSURE: 116 MMHG | OXYGEN SATURATION: 99 % | WEIGHT: 165.6 LBS | TEMPERATURE: 98.4 F

## 2023-10-02 DIAGNOSIS — R05.1 ACUTE COUGH: ICD-10-CM

## 2023-10-02 DIAGNOSIS — R06.02 SHORTNESS OF BREATH: ICD-10-CM

## 2023-10-02 DIAGNOSIS — R07.9 CHEST PAIN, UNSPECIFIED TYPE: Primary | ICD-10-CM

## 2023-10-02 DIAGNOSIS — R05.1 ACUTE COUGH: Primary | ICD-10-CM

## 2023-10-02 LAB
ANION GAP SERPL CALCULATED.3IONS-SCNC: 8 MMOL/L (ref 7–16)
BASOPHILS # BLD: 0.03 K/UL (ref 0–0.2)
BASOPHILS NFR BLD: 1 % (ref 0–2)
BUN SERPL-MCNC: 10 MG/DL (ref 6–20)
CALCIUM SERPL-MCNC: 9.9 MG/DL (ref 8.6–10.2)
CHLORIDE SERPL-SCNC: 102 MMOL/L (ref 98–107)
CO2 SERPL-SCNC: 26 MMOL/L (ref 22–29)
CREAT SERPL-MCNC: 0.7 MG/DL (ref 0.5–1)
D DIMER: 408 NG/ML DDU (ref 0–232)
EOSINOPHIL # BLD: 0.15 K/UL (ref 0.05–0.5)
EOSINOPHILS RELATIVE PERCENT: 2 % (ref 0–6)
ERYTHROCYTE [DISTWIDTH] IN BLOOD BY AUTOMATED COUNT: 11.8 % (ref 11.5–15)
GFR SERPL CREATININE-BSD FRML MDRD: >60 ML/MIN/1.73M2
GLUCOSE SERPL-MCNC: 103 MG/DL (ref 74–99)
HCT VFR BLD AUTO: 40.7 % (ref 34–48)
HGB BLD-MCNC: 13.5 G/DL (ref 11.5–15.5)
IMM GRANULOCYTES # BLD AUTO: 0.03 K/UL (ref 0–0.58)
IMM GRANULOCYTES NFR BLD: 1 % (ref 0–5)
LYMPHOCYTES NFR BLD: 1.49 K/UL (ref 1.5–4)
LYMPHOCYTES RELATIVE PERCENT: 23 % (ref 20–42)
MCH RBC QN AUTO: 31.1 PG (ref 26–35)
MCHC RBC AUTO-ENTMCNC: 33.2 G/DL (ref 32–34.5)
MCV RBC AUTO: 93.8 FL (ref 80–99.9)
MONOCYTES NFR BLD: 0.75 K/UL (ref 0.1–0.95)
MONOCYTES NFR BLD: 11 % (ref 2–12)
NEUTROPHILS NFR BLD: 63 % (ref 43–80)
NEUTS SEG NFR BLD: 4.14 K/UL (ref 1.8–7.3)
PLATELET # BLD AUTO: 233 K/UL (ref 130–450)
PMV BLD AUTO: 11.1 FL (ref 7–12)
POTASSIUM SERPL-SCNC: 3.9 MMOL/L (ref 3.5–5)
RBC # BLD AUTO: 4.34 M/UL (ref 3.5–5.5)
SARS-COV-2 RDRP RESP QL NAA+PROBE: NOT DETECTED
SODIUM SERPL-SCNC: 136 MMOL/L (ref 132–146)
SPECIMEN DESCRIPTION: NORMAL
WBC OTHER # BLD: 6.6 K/UL (ref 4.5–11.5)

## 2023-10-02 PROCEDURE — 6360000004 HC RX CONTRAST MEDICATION: Performed by: RADIOLOGY

## 2023-10-02 PROCEDURE — 80048 BASIC METABOLIC PNL TOTAL CA: CPT

## 2023-10-02 PROCEDURE — 71275 CT ANGIOGRAPHY CHEST: CPT

## 2023-10-02 PROCEDURE — 87635 SARS-COV-2 COVID-19 AMP PRB: CPT

## 2023-10-02 PROCEDURE — 93005 ELECTROCARDIOGRAM TRACING: CPT | Performed by: EMERGENCY MEDICINE

## 2023-10-02 PROCEDURE — 99214 OFFICE O/P EST MOD 30 MIN: CPT | Performed by: STUDENT IN AN ORGANIZED HEALTH CARE EDUCATION/TRAINING PROGRAM

## 2023-10-02 PROCEDURE — 99285 EMERGENCY DEPT VISIT HI MDM: CPT

## 2023-10-02 PROCEDURE — 85379 FIBRIN DEGRADATION QUANT: CPT

## 2023-10-02 PROCEDURE — 85025 COMPLETE CBC W/AUTO DIFF WBC: CPT

## 2023-10-02 RX ORDER — GABAPENTIN 300 MG/1
CAPSULE ORAL
COMMUNITY
Start: 2023-06-27

## 2023-10-02 RX ORDER — PREDNISONE 20 MG/1
40 TABLET ORAL DAILY
Qty: 10 TABLET | Refills: 0 | Status: SHIPPED | OUTPATIENT
Start: 2023-10-02 | End: 2023-10-07

## 2023-10-02 RX ORDER — DOXYCYCLINE HYCLATE 100 MG
100 TABLET ORAL 2 TIMES DAILY
Qty: 14 TABLET | Refills: 0 | Status: SHIPPED | OUTPATIENT
Start: 2023-10-02 | End: 2023-10-09

## 2023-10-02 RX ADMIN — IOPAMIDOL 75 ML: 755 INJECTION, SOLUTION INTRAVENOUS at 18:47

## 2023-10-02 ASSESSMENT — ENCOUNTER SYMPTOMS
RHINORRHEA: 0
ABDOMINAL PAIN: 0
SHORTNESS OF BREATH: 1
NAUSEA: 0
COUGH: 1
VOMITING: 0

## 2023-10-02 ASSESSMENT — PAIN - FUNCTIONAL ASSESSMENT: PAIN_FUNCTIONAL_ASSESSMENT: NONE - DENIES PAIN

## 2023-10-02 NOTE — ED NOTES
Radiology Procedure Waiver   Name: Poly Hong  : 1990  MRN: [id number]    Date:  10/2/23    Time: 5:11 PM EDT    Benefits of immediately proceeding with Radiology exam(s) without pre-testing outweigh the risks or are not indicated as specified below and therefore the following is/are being waived:    [x] Pregnancy test   [] Patients LMP on-time and regular. [x] Patient had Tubal Ligation or has other Contraception Device. [] Patient  is Menopausal or Premenarcheal.    [] Patient had Full or Partial Hysterectomy. [] Protocol for Iodine allergy    [] MRI Questionnaire     [] BUN/Creatinine   [] Patient age w/no hx of renal dysfunction. [] Patient on Dialysis. [] Recent Normal Labs.   Electronically signed by Noemy Hendricks DO on 10/2/23 at 5:11 PM EDT                 Noemy Hendricks DO  Resident  10/02/23 [id number]

## 2023-10-02 NOTE — PROGRESS NOTES
Claudia Galindo (:  1990) is a 34 y.o. female,Established patient, here for evaluation of the following chief complaint(s):  Shortness of Breath (Onset x 4 days /States that she was seen yesterday at [facility] and had breathing treatment done at office- was prescribed a rescue inhaler -/Would like an xray- states that she carter snot have any other symptoms /Last used albuterol around 9 AM this AM )         ASSESSMENT/PLAN:  1. Acute cough  -     D-Dimer, Quantitative; Future  -     XR CHEST (2 VW); Future  -     predniSONE (DELTASONE) 20 MG tablet; Take 2 tablets by mouth daily for 5 days, Disp-10 tablet, R-0Normal  -     doxycycline hyclate (VIBRA-TABS) 100 MG tablet; Take 1 tablet by mouth 2 times daily for 7 days, Disp-14 tablet, R-0Normal  -     CBC with Auto Differential; Future  2. Shortness of breath  -     D-Dimer, Quantitative; Future  -     XR CHEST (2 VW); Future  -     predniSONE (DELTASONE) 20 MG tablet; Take 2 tablets by mouth daily for 5 days, Disp-10 tablet, R-0Normal  -     doxycycline hyclate (VIBRA-TABS) 100 MG tablet; Take 1 tablet by mouth 2 times daily for 7 days, Disp-14 tablet, R-0Normal  -     CBC with Auto Differential; Future    Differential includes bronchitis, adult onset asthma exacerbation, viral illness, PE. Discussed differential with patient. Presented immediate ER eval as an option. However give pulse and pulse ox are normal in the office we will do a STAT d dimer, cxr and if all are negative empirically treat for bronchitis. Discussed implications of PE and used shared decision making to come up with plan. Discussed return and ER precautions. Patient  verbalized understanding    Return if symptoms worsen or fail to improve.          Subjective   SUBJECTIVE/OBJECTIVE:  SOB, chest hurts, no fever  Remote asthma hx  Recently rx inhaler-it is helping somewhat  Feels like she is not getting a good breath  Pulse ox at home was 88  She flies regularly for work, last  flight

## 2023-10-03 LAB
EKG ATRIAL RATE: 92 BPM
EKG P AXIS: 63 DEGREES
EKG P-R INTERVAL: 130 MS
EKG Q-T INTERVAL: 358 MS
EKG QRS DURATION: 78 MS
EKG QTC CALCULATION (BAZETT): 442 MS
EKG R AXIS: 27 DEGREES
EKG T AXIS: 36 DEGREES
EKG VENTRICULAR RATE: 92 BPM

## 2023-10-03 PROCEDURE — 93010 ELECTROCARDIOGRAM REPORT: CPT | Performed by: INTERNAL MEDICINE

## 2024-02-21 ENCOUNTER — OFFICE VISIT (OUTPATIENT)
Dept: FAMILY MEDICINE CLINIC | Age: 34
End: 2024-02-21
Payer: COMMERCIAL

## 2024-02-21 VITALS
HEART RATE: 84 BPM | OXYGEN SATURATION: 99 % | WEIGHT: 168.6 LBS | BODY MASS INDEX: 28.79 KG/M2 | TEMPERATURE: 98.4 F | HEIGHT: 64 IN

## 2024-02-21 DIAGNOSIS — J02.0 STREP PHARYNGITIS: Primary | ICD-10-CM

## 2024-02-21 DIAGNOSIS — J02.9 SORE THROAT: ICD-10-CM

## 2024-02-21 LAB — S PYO AG THROAT QL: POSITIVE

## 2024-02-21 PROCEDURE — 99213 OFFICE O/P EST LOW 20 MIN: CPT | Performed by: NURSE PRACTITIONER

## 2024-02-21 PROCEDURE — 87880 STREP A ASSAY W/OPTIC: CPT | Performed by: NURSE PRACTITIONER

## 2024-02-21 RX ORDER — AMOXICILLIN 500 MG/1
500 CAPSULE ORAL 2 TIMES DAILY
Qty: 20 CAPSULE | Refills: 0 | Status: SHIPPED | OUTPATIENT
Start: 2024-02-21 | End: 2024-03-02

## 2024-02-21 NOTE — PROGRESS NOTES
Chief Complaint:   Sore Throat (Onset a couple days), Headache, and Fever      History of Present Illness   Source of history provided by:  patient.    Isi Wilder is a 33 y.o. old female who presents to the [facility] care for sore throat.  Pt states sore throat began 2 days ago.  States they have fever, headache, sore throat, sinus pressure, and nasal congestion.  Patient recently tested positive for influenza B and symptoms were improving and then significantly worsened over the last 2 days.  Denies any cough, wheezing, chest congestion, chest pain, shortness of breath, abdominal discomfort, nausea, vomiting, body aches, otalgia, and malaise. Has tried tylenol at home with some symptomatic relief.          Exposed To:  Streptococcus: yes.                             Infectious Mononucleosis:  unknown.     Review of Systems   Unless otherwise stated in this report or unable to obtain because of the patient's clinical or mental status as evidenced by the medical record, this patients's positive and negative responses for Review of Systems, constitutional, psych, eyes, ENT, cardiovascular, respiratory, gastrointestinal, neurological, genitourinary, musculoskeletal, integument systems and systems related to the presenting problem are either stated in the preceding or were not pertinent or were negative for the symptoms and/or complaints related to the medical problem.    Past Medical History:  has a past medical history of Anemia, Depression, and Moderate episode of recurrent major depressive disorder (HCC).   Past Surgical History:  has a past surgical history that includes Tonsillectomy.  Social History:  reports that she quit smoking about 13 years ago. Her smoking use included cigarettes. She has never used smokeless tobacco. She reports current alcohol use. She reports that she does not use drugs.  Family History: family history includes Cancer in her mother; Osteoporosis in her mother; Thyroid

## 2024-10-09 ENCOUNTER — OFFICE VISIT (OUTPATIENT)
Dept: FAMILY MEDICINE CLINIC | Age: 34
End: 2024-10-09
Payer: COMMERCIAL

## 2024-10-09 VITALS
HEART RATE: 84 BPM | DIASTOLIC BLOOD PRESSURE: 72 MMHG | OXYGEN SATURATION: 97 % | WEIGHT: 167 LBS | HEIGHT: 64 IN | TEMPERATURE: 97.6 F | SYSTOLIC BLOOD PRESSURE: 106 MMHG | BODY MASS INDEX: 28.51 KG/M2

## 2024-10-09 DIAGNOSIS — H57.12 ACUTE LEFT EYE PAIN: Primary | ICD-10-CM

## 2024-10-09 DIAGNOSIS — S05.52XA FOREIGN BODY IN LENS OF LEFT EYE, INITIAL ENCOUNTER: ICD-10-CM

## 2024-10-09 PROCEDURE — 99213 OFFICE O/P EST LOW 20 MIN: CPT | Performed by: EMERGENCY MEDICINE

## 2024-10-09 ASSESSMENT — ENCOUNTER SYMPTOMS
SORE THROAT: 0
VOMITING: 0
BACK PAIN: 0
PHOTOPHOBIA: 1
EYE PAIN: 1
DIARRHEA: 0
WHEEZING: 0
NAUSEA: 0
EYE REDNESS: 1
ABDOMINAL DISTENTION: 0
SHORTNESS OF BREATH: 0
SINUS PRESSURE: 0
EYE DISCHARGE: 0
COUGH: 0

## 2024-10-09 ASSESSMENT — VISUAL ACUITY: OU: 1

## 2024-10-09 NOTE — PROGRESS NOTES
Chief Complaint:   Eye Problem (Left eye pain, unable to get a hold of eye doctor/Has flushed it, used eye drops /Started yesterday /Feels like something in it //)      History of Present Illness   HPI:  Isi Wilder is a 34 y.o. female who [facility] for acute onset L eye pain, possible FB; took contact out and pain started    Prior to Visit Medications    Medication Sig Taking? Authorizing Provider   lurasidone (LATUDA) 20 MG TABS tablet   ProviderKarina MD   buPROPion (WELLBUTRIN XL) 150 MG extended release tablet   ProviderKarina MD   Levonorgestrel (MIRENA, 52 MG, IU) by Intrauterine route  Provider, MD Karina       Review of Systems   Review of Systems   Constitutional:  Negative for chills and fever.   HENT:  Negative for ear pain, sinus pressure and sore throat.    Eyes:  Positive for photophobia, pain and redness. Negative for discharge.   Respiratory:  Negative for cough, shortness of breath and wheezing.    Cardiovascular:  Negative for chest pain.   Gastrointestinal:  Negative for abdominal distention, diarrhea, nausea and vomiting.   Genitourinary:  Negative for dysuria and frequency.   Musculoskeletal:  Negative for arthralgias and back pain.   Skin:  Negative for rash and wound.   Neurological:  Negative for weakness and headaches.   Hematological:  Negative for adenopathy.   Psychiatric/Behavioral: Negative.     All other systems reviewed and are negative.      Patient's medical, social, and family history reviewed    Past Medical History:  has a past medical history of Anemia, Depression, and Moderate episode of recurrent major depressive disorder (HCC).   Past Surgical History:  has a past surgical history that includes Tonsillectomy.  Social History:  reports that she quit smoking about 14 years ago. Her smoking use included cigarettes. She has never used smokeless tobacco. She reports current alcohol use. She reports that she does not use

## 2025-03-19 ENCOUNTER — OFFICE VISIT (OUTPATIENT)
Dept: ORTHOPEDIC SURGERY | Age: 35
End: 2025-03-19
Payer: COMMERCIAL

## 2025-03-19 VITALS
HEART RATE: 74 BPM | DIASTOLIC BLOOD PRESSURE: 67 MMHG | BODY MASS INDEX: 28.67 KG/M2 | WEIGHT: 167 LBS | SYSTOLIC BLOOD PRESSURE: 107 MMHG

## 2025-03-19 DIAGNOSIS — M25.872 SESAMOIDITIS OF LEFT FOOT: Primary | ICD-10-CM

## 2025-03-19 DIAGNOSIS — M79.672 LEFT FOOT PAIN: ICD-10-CM

## 2025-03-19 PROCEDURE — 99203 OFFICE O/P NEW LOW 30 MIN: CPT | Performed by: PHYSICIAN ASSISTANT

## 2025-03-19 RX ORDER — METHYLPREDNISOLONE 4 MG/1
TABLET ORAL
Qty: 1 KIT | Refills: 0 | Status: SHIPPED | OUTPATIENT
Start: 2025-03-19

## 2025-03-19 NOTE — PATIENT INSTRUCTIONS
*At this time I have recommended an oral steroid, Medrol Dose Yonis as instructed. I did discuss potential side effect such as GI upset, mood changes, depression, anxiety, change in sleep habits.  The patient develops any of these signs or symptoms they will call the office immediately and we will discontinue the medication in appropriate fashion.  They are aware that he should not use any other oral anti-inflammatories while on the oral steroids.  They can use Tylenol OTC PRN.

## 2025-03-19 NOTE — PROGRESS NOTES
seconds.  (-) Russell, (-) Indu, (-) Heel bump (-) Pain with applied pressure through arch    /67   Pulse 74   Wt 75.8 kg (167 lb)   BMI 28.67 kg/m²      XR: 3 view left foot x-rays were obtained in the clinic today which show no acute findings of fracture or dislocation.    The imaging will be reviewed and interpreted by Radiologist.  The report was not complete at the time of this note.  Please refer to Radiologist report for their interpretation.           ASSESSMENT:   Diagnosis Orders   1. Sesamoiditis of left foot  methylPREDNISolone (MEDROL, SAMMIE,) 4 MG tablet      2. Left foot pain  XR FOOT LEFT (MIN 3 VIEWS)    methylPREDNISolone (MEDROL, SAMMIE,) 4 MG tablet          PLAN:  Pt is a new pt to me who presents to the clinic today with complaints of  left foot pain x 2 days, kicking a ball with kid and jammed foot into ground.  On exam pt has tenderness to palpation at the plantar aspect of the first MTP joint and the sesamoids.  The remainder of the foot ankle exam is unremarkable.  Imaging was reviewed in clinic today showing no acute findings of fracture or dislocation.  At this time recommend conservative treatment for sesamoiditis versus metatarsalgia.  At this time I have recommended an oral steroid, Medrol Dose Sammie as instructed. I did discuss potential side effect such as GI upset, mood changes, depression, anxiety, change in sleep habits.  The patient develops any of these signs or symptoms they will call the office immediately and we will discontinue the medication in appropriate fashion.  They are aware that he should not use any other oral anti-inflammatories while on the oral steroids.  They can use Tylenol OTC PRN.  We did apply a metatarsal pad in her shoe for comfort today.  She can use this until symptoms resolved.  If symptoms are not improving over the course of the next week she should return to the clinic or call for further evaluation.    Pt should apply ice to the effected area

## 2025-03-25 ENCOUNTER — PATIENT MESSAGE (OUTPATIENT)
Dept: ORTHOPEDIC SURGERY | Age: 35
End: 2025-03-25

## 2025-03-25 DIAGNOSIS — M79.672 LEFT FOOT PAIN: ICD-10-CM

## 2025-03-25 DIAGNOSIS — M25.872 SESAMOIDITIS OF LEFT FOOT: Primary | ICD-10-CM
